# Patient Record
Sex: MALE | Race: WHITE | NOT HISPANIC OR LATINO | Employment: FULL TIME | URBAN - NONMETROPOLITAN AREA
[De-identification: names, ages, dates, MRNs, and addresses within clinical notes are randomized per-mention and may not be internally consistent; named-entity substitution may affect disease eponyms.]

---

## 2024-07-02 ENCOUNTER — OFFICE VISIT (OUTPATIENT)
Dept: FAMILY MEDICINE CLINIC | Facility: CLINIC | Age: 49
End: 2024-07-02
Payer: COMMERCIAL

## 2024-07-02 VITALS
DIASTOLIC BLOOD PRESSURE: 84 MMHG | WEIGHT: 261.4 LBS | HEART RATE: 78 BPM | HEIGHT: 72 IN | OXYGEN SATURATION: 99 % | SYSTOLIC BLOOD PRESSURE: 128 MMHG | BODY MASS INDEX: 35.41 KG/M2 | RESPIRATION RATE: 16 BRPM

## 2024-07-02 DIAGNOSIS — I10 HYPERTENSION, ESSENTIAL: ICD-10-CM

## 2024-07-02 DIAGNOSIS — R94.31 ABNORMAL EKG: ICD-10-CM

## 2024-07-02 DIAGNOSIS — Z83.3 FAMILY HISTORY OF DIABETES MELLITUS: ICD-10-CM

## 2024-07-02 DIAGNOSIS — Z12.5 SCREENING FOR PROSTATE CANCER: ICD-10-CM

## 2024-07-02 DIAGNOSIS — Z00.00 PHYSICAL EXAM: Primary | ICD-10-CM

## 2024-07-02 DIAGNOSIS — M25.551 RIGHT HIP PAIN: ICD-10-CM

## 2024-07-02 DIAGNOSIS — Z12.11 SCREENING FOR MALIGNANT NEOPLASM OF COLON: ICD-10-CM

## 2024-07-02 DIAGNOSIS — R01.1 MURMUR: ICD-10-CM

## 2024-07-02 DIAGNOSIS — E66.01 MORBID (SEVERE) OBESITY DUE TO EXCESS CALORIES: ICD-10-CM

## 2024-07-02 RX ORDER — MELOXICAM 15 MG/1
15 TABLET ORAL DAILY
Qty: 90 TABLET | Refills: 1 | Status: SHIPPED | OUTPATIENT
Start: 2024-07-02

## 2024-07-02 RX ORDER — LISINOPRIL 20 MG/1
20 TABLET ORAL DAILY
Qty: 90 TABLET | Refills: 1 | Status: SHIPPED | OUTPATIENT
Start: 2024-07-02

## 2024-07-02 RX ORDER — LISINOPRIL 20 MG/1
20 TABLET ORAL DAILY
COMMUNITY
Start: 2024-04-16 | End: 2024-07-02 | Stop reason: SDUPTHER

## 2024-07-02 RX ORDER — MELOXICAM 15 MG/1
15 TABLET ORAL DAILY
COMMUNITY
End: 2024-07-02 | Stop reason: SDUPTHER

## 2024-07-02 NOTE — ASSESSMENT & PLAN NOTE
Patient has no chest pain.  He has no shortness of breath.  He has no abnormal symptoms.  His EKG shows inverted T wave in V4 and V5 and V6.  Has criteria for LVH.  We are going to get a stress Myoview.
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
## 2024-07-26 ENCOUNTER — LAB (OUTPATIENT)
Dept: FAMILY MEDICINE CLINIC | Facility: CLINIC | Age: 49
End: 2024-07-26
Payer: COMMERCIAL

## 2024-07-26 DIAGNOSIS — Z12.11 SCREENING FOR MALIGNANT NEOPLASM OF COLON: ICD-10-CM

## 2024-07-26 DIAGNOSIS — Z00.00 PHYSICAL EXAM: ICD-10-CM

## 2024-07-26 DIAGNOSIS — Z12.5 SCREENING FOR PROSTATE CANCER: ICD-10-CM

## 2024-07-26 DIAGNOSIS — Z83.3 FAMILY HISTORY OF DIABETES MELLITUS: ICD-10-CM

## 2024-07-26 DIAGNOSIS — I10 HYPERTENSION, ESSENTIAL: ICD-10-CM

## 2024-07-26 DIAGNOSIS — E66.01 MORBID (SEVERE) OBESITY DUE TO EXCESS CALORIES: ICD-10-CM

## 2024-07-26 PROCEDURE — 36415 COLL VENOUS BLD VENIPUNCTURE: CPT | Performed by: FAMILY MEDICINE

## 2024-07-27 LAB
25(OH)D3+25(OH)D2 SERPL-MCNC: 53.5 NG/ML (ref 30–100)
ALBUMIN SERPL-MCNC: 4.1 G/DL (ref 4.1–5.1)
ALP SERPL-CCNC: 66 IU/L (ref 44–121)
ALT SERPL-CCNC: 23 IU/L (ref 0–44)
AST SERPL-CCNC: 31 IU/L (ref 0–40)
BASOPHILS # BLD AUTO: 0.2 X10E3/UL (ref 0–0.2)
BASOPHILS NFR BLD AUTO: 2 %
BILIRUB SERPL-MCNC: 0.5 MG/DL (ref 0–1.2)
BUN SERPL-MCNC: 26 MG/DL (ref 6–24)
BUN/CREAT SERPL: 18 (ref 9–20)
CALCIUM SERPL-MCNC: 9.5 MG/DL (ref 8.7–10.2)
CHLORIDE SERPL-SCNC: 102 MMOL/L (ref 96–106)
CHOLEST SERPL-MCNC: 145 MG/DL (ref 100–199)
CK SERPL-CCNC: 341 U/L (ref 49–439)
CO2 SERPL-SCNC: 25 MMOL/L (ref 20–29)
CREAT SERPL-MCNC: 1.48 MG/DL (ref 0.76–1.27)
EGFRCR SERPLBLD CKD-EPI 2021: 58 ML/MIN/1.73
EOSINOPHIL # BLD AUTO: 0.1 X10E3/UL (ref 0–0.4)
EOSINOPHIL NFR BLD AUTO: 2 %
ERYTHROCYTE [DISTWIDTH] IN BLOOD BY AUTOMATED COUNT: 13 % (ref 11.6–15.4)
GLOBULIN SER CALC-MCNC: 2.5 G/DL (ref 1.5–4.5)
GLUCOSE SERPL-MCNC: 95 MG/DL (ref 70–99)
HBA1C MFR BLD: 5.6 % (ref 4.8–5.6)
HCT VFR BLD AUTO: 41.5 % (ref 37.5–51)
HCV IGG SERPL QL IA: NON REACTIVE
HDLC SERPL-MCNC: 27 MG/DL
HGB BLD-MCNC: 14.1 G/DL (ref 13–17.7)
IMM GRANULOCYTES # BLD AUTO: 0.3 X10E3/UL (ref 0–0.1)
IMM GRANULOCYTES NFR BLD AUTO: 4 %
LDLC SERPL CALC-MCNC: 95 MG/DL (ref 0–99)
LYMPHOCYTES # BLD AUTO: 2.2 X10E3/UL (ref 0.7–3.1)
LYMPHOCYTES NFR BLD AUTO: 26 %
MCH RBC QN AUTO: 30.6 PG (ref 26.6–33)
MCHC RBC AUTO-ENTMCNC: 34 G/DL (ref 31.5–35.7)
MCV RBC AUTO: 90 FL (ref 79–97)
MONOCYTES # BLD AUTO: 0.9 X10E3/UL (ref 0.1–0.9)
MONOCYTES NFR BLD AUTO: 10 %
NEUTROPHILS # BLD AUTO: 4.9 X10E3/UL (ref 1.4–7)
NEUTROPHILS NFR BLD AUTO: 56 %
PLATELET # BLD AUTO: 177 X10E3/UL (ref 150–450)
POTASSIUM SERPL-SCNC: 4.2 MMOL/L (ref 3.5–5.2)
PROT SERPL-MCNC: 6.6 G/DL (ref 6–8.5)
PSA SERPL-MCNC: 0.8 NG/ML (ref 0–4)
RBC # BLD AUTO: 4.61 X10E6/UL (ref 4.14–5.8)
SODIUM SERPL-SCNC: 140 MMOL/L (ref 134–144)
TRIGL SERPL-MCNC: 125 MG/DL (ref 0–149)
TSH SERPL DL<=0.005 MIU/L-ACNC: 1.02 UIU/ML (ref 0.45–4.5)
VLDLC SERPL CALC-MCNC: 23 MG/DL (ref 5–40)
WBC # BLD AUTO: 8.6 X10E3/UL (ref 3.4–10.8)

## 2024-07-30 ENCOUNTER — OFFICE VISIT (OUTPATIENT)
Dept: CARDIOLOGY | Facility: CLINIC | Age: 49
End: 2024-07-30
Payer: COMMERCIAL

## 2024-07-30 VITALS
DIASTOLIC BLOOD PRESSURE: 76 MMHG | HEART RATE: 78 BPM | HEIGHT: 72 IN | BODY MASS INDEX: 35.21 KG/M2 | SYSTOLIC BLOOD PRESSURE: 115 MMHG | WEIGHT: 260 LBS | RESPIRATION RATE: 16 BRPM | OXYGEN SATURATION: 98 %

## 2024-07-30 DIAGNOSIS — R94.31 ABNORMAL EKG: Primary | ICD-10-CM

## 2024-07-30 DIAGNOSIS — E66.01 MORBID (SEVERE) OBESITY DUE TO EXCESS CALORIES: ICD-10-CM

## 2024-07-30 DIAGNOSIS — I10 HYPERTENSION, ESSENTIAL: ICD-10-CM

## 2024-07-30 DIAGNOSIS — I25.5 ISCHEMIC CARDIOMYOPATHY: ICD-10-CM

## 2024-07-30 DIAGNOSIS — R94.39 ABNORMAL NUCLEAR STRESS TEST: ICD-10-CM

## 2024-07-30 DIAGNOSIS — Z91.89 AT RISK FOR SUDDEN CARDIAC DEATH: ICD-10-CM

## 2024-07-30 DIAGNOSIS — R01.1 MURMUR: ICD-10-CM

## 2024-07-30 PROCEDURE — 99204 OFFICE O/P NEW MOD 45 MIN: CPT | Performed by: INTERNAL MEDICINE

## 2024-07-30 RX ORDER — CARVEDILOL 12.5 MG/1
12.5 TABLET ORAL 2 TIMES DAILY
Qty: 180 TABLET | Refills: 3 | Status: SHIPPED | OUTPATIENT
Start: 2024-07-30

## 2024-07-30 RX ORDER — ASPIRIN 81 MG/1
81 TABLET ORAL DAILY
Qty: 90 TABLET | Refills: 3 | Status: SHIPPED | OUTPATIENT
Start: 2024-07-30

## 2024-07-30 RX ORDER — SACUBITRIL AND VALSARTAN 24; 26 MG/1; MG/1
1 TABLET, FILM COATED ORAL 2 TIMES DAILY
Qty: 60 TABLET | Refills: 1 | Status: SHIPPED | OUTPATIENT
Start: 2024-07-30

## 2024-07-30 RX ORDER — DAPAGLIFLOZIN 10 MG/1
10 TABLET, FILM COATED ORAL DAILY
Qty: 90 TABLET | Refills: 2 | Status: SHIPPED | OUTPATIENT
Start: 2024-07-30 | End: 2024-08-01 | Stop reason: CLARIF

## 2024-07-30 NOTE — PROGRESS NOTES
[facility]  [facility] CARDIOLOGY  [address] [place] [address]  Dept: [phone]  Dept Fax: [phone]    Robson Babin  1975    New Patient Office Note    History of Present Illness:  Robson Babin is a 48 y.o. male who presents to the clinic for  Abnormal stress test nuclear and heart murmur- He is 48 years old, has hypertension, know to have a heart murmur since he was 15, seems has never been totally evaluated for he denies any CP., SOB, edema, palpitations, he went to see Dr Christian and a EKG was abnormal so he came in for a stress nuclear, his EKG showed LVH and poor R wave progression, his stress nuclear indicates large fixed apical defect and also decreased counts in the inferior wall at resting and much worse on stress, his Ef is very low around 20% with global Hypokinesis,He walked on the treadmill for over 6 minutes although he states was able to keep going,  his parents both has had CAD and CABG,, his cardiac exam     is normal except a GORGE in the left sternal border  , he takes Lisinopril for hypertension and has a Lipid profile with LDL 95,  his creatinine is elevated at 1,6. Will get a troponin and BNP, also a Urine test, and urine protein, , will start Entresto 24.26 bid, coreg 12,5 bid plus farxiga 10 mg daily, he is schedule soon for an echo, will also set him for a life vest and cardiac cath    The following portions of the patient's history were reviewed and updated as appropriate: allergies, current medications, past family history, past medical history, past social history, past surgical history, and problem list.    Medications:  aspirin  carvedilol  dapagliflozin Propanediol tablet  Entresto tablet  sertraline    Subjective  No Known Allergies     Past Medical History:   Diagnosis Date    Hypertension        No past surgical history on file.    Family History   Problem Relation Age of Onset    Heart disease Mother     Heart disease Father      "Prostate cancer Father         Social History     Socioeconomic History    Marital status:    Tobacco Use    Smoking status: Former     Types: Cigarettes     Start date:      Quit date:      Years since quittin.5    Smokeless tobacco: Never   Vaping Use    Vaping status: Every Day    Substances: Nicotine   Substance and Sexual Activity    Drug use: Yes     Types: Marijuana       Review of Systems   Constitutional: Negative.    HENT: Negative.     Respiratory: Negative.     Cardiovascular: Negative.    Endocrine: Negative.    Genitourinary: Negative.    Musculoskeletal: Negative.    Skin: Negative.    Allergic/Immunologic: Negative.    Neurological: Negative.    Hematological: Negative.    Psychiatric/Behavioral: Negative.       Cardiovascular Procedures    ECHO/MUGA:  STRESS TESTS:   CARDIAC CATH:   DEVICES:   HOLTER:   CT/MRI:   VASCULAR:   CARDIOTHORACIC:     Objective  Vitals:    24 1528   BP: 115/76   Pulse: 78   Resp: 16   SpO2: 98%   Weight: 118 kg (260 lb)   Height: 182.9 cm (72\")   PainSc: 0-No pain       Physical Exam  Vitals reviewed.   Constitutional:       Appearance: Healthy appearance. Not in distress.   Eyes:      Pupils: Pupils are equal, round, and reactive to light.   HENT:    Mouth/Throat:      Pharynx: Oropharynx is clear.   Neck:      Thyroid: Thyroid normal.      Vascular: No JVR. JVD normal.   Pulmonary:      Effort: Pulmonary effort is normal.      Breath sounds: Normal breath sounds. No wheezing. No rhonchi. No rales.   Chest:      Chest wall: Not tender to palpatation.   Cardiovascular:      PMI at left midclavicular line. Normal rate. Regular rhythm. Normal S1. Normal S2.       Murmurs: There is no murmur.      No gallop.  No click. No rub.   Pulses:     Intact distal pulses.      Carotid: 3+ bilaterally.     Radial: 3+ bilaterally.     Femoral: 3+ bilaterally.     Dorsalis pedis: 3+ bilaterally.     Posterior tibial: 3+ bilaterally.  Edema:     Peripheral edema " absent.   Abdominal:      General: Bowel sounds are normal.      Palpations: Abdomen is soft.      Tenderness: There is no abdominal tenderness.   Musculoskeletal: Normal range of motion.         General: No tenderness.      Cervical back: Normal range of motion and neck supple. Skin:     General: Skin is warm and dry.   Neurological:      General: No focal deficit present.      Mental Status: Alert and oriented to person, place and time.        Diagnostic Data  Procedures    Assessment and Plan  Diagnoses and all orders for this visit:    Abnormal EKG- suggesting old septal MI, abnormal stress nuclear also low EF    Hypertension, essential- Bp is fine 130.70, will use Entresto and Coreg instead Lisinopril  -     Cancel: Urinalysis without microscopic (no culture) - Urine, Clean Catch; Future  -     Protein / Creatinine Ratio, Urine - Urine, Clean Catch  -     Urinalysis without microscopic (no culture) - Urine, Clean Catch        Abnormal nuclear stress test- Suggest ischemic cardiomyopathy low Ef 20%,, will cath    Ischemic cardiomyopathy- Possible, likely, will cath, his LV is dilated as well as the RV  -     proBNP  -     Troponin T    At risk for sudden cardiac death- Will need a life vest.  Heart murmur- seems GORGE. Will review the echo    Other orders  -     sacubitril-valsartan (Entresto) 24-26 MG tablet; Take 1 tablet by mouth 2 (Two) Times a Day.  -     carvedilol (COREG) 12.5 MG tablet; Take 1 tablet by mouth 2 (Two) Times a Day.  -     dapagliflozin Propanediol (Farxiga) 10 MG tablet; Take 10 mg by mouth Daily.  -     aspirin 81 MG EC tablet; Take 1 tablet by mouth Daily.        No follow-ups on file.    Rony Aldridge MD  07/30/2024

## 2024-07-30 NOTE — H&P (VIEW-ONLY)
[facility]  [facility] CARDIOLOGY  [address] [place] [address]  Dept: [phone]  Dept Fax: [phone]    Robson Babin  1975    New Patient Office Note    History of Present Illness:  Robson Babin is a 48 y.o. male who presents to the clinic for  Abnormal stress test nuclear and heart murmur- He is 48 years old, has hypertension, know to have a heart murmur since he was 15, seems has never been totally evaluated for he denies any CP., SOB, edema, palpitations, he went to see Dr Christian and a EKG was abnormal so he came in for a stress nuclear, his EKG showed LVH and poor R wave progression, his stress nuclear indicates large fixed apical defect and also decreased counts in the inferior wall at resting and much worse on stress, his Ef is very low around 20% with global Hypokinesis,He walked on the treadmill for over 6 minutes although he states was able to keep going,  his parents both has had CAD and CABG,, his cardiac exam     is normal except a GORGE in the left sternal border  , he takes Lisinopril for hypertension and has a Lipid profile with LDL 95,  his creatinine is elevated at 1,6. Will get a troponin and BNP, also a Urine test, and urine protein, , will start Entresto 24.26 bid, coreg 12,5 bid plus farxiga 10 mg daily, he is schedule soon for an echo, will also set him for a life vest and cardiac cath    The following portions of the patient's history were reviewed and updated as appropriate: allergies, current medications, past family history, past medical history, past social history, past surgical history, and problem list.    Medications:  aspirin  carvedilol  dapagliflozin Propanediol tablet  Entresto tablet  sertraline    Subjective  No Known Allergies     Past Medical History:   Diagnosis Date    Hypertension        No past surgical history on file.    Family History   Problem Relation Age of Onset    Heart disease Mother     Heart disease Father      "Prostate cancer Father         Social History     Socioeconomic History    Marital status:    Tobacco Use    Smoking status: Former     Types: Cigarettes     Start date:      Quit date:      Years since quittin.5    Smokeless tobacco: Never   Vaping Use    Vaping status: Every Day    Substances: Nicotine   Substance and Sexual Activity    Drug use: Yes     Types: Marijuana       Review of Systems   Constitutional: Negative.    HENT: Negative.     Respiratory: Negative.     Cardiovascular: Negative.    Endocrine: Negative.    Genitourinary: Negative.    Musculoskeletal: Negative.    Skin: Negative.    Allergic/Immunologic: Negative.    Neurological: Negative.    Hematological: Negative.    Psychiatric/Behavioral: Negative.       Cardiovascular Procedures    ECHO/MUGA:  STRESS TESTS:   CARDIAC CATH:   DEVICES:   HOLTER:   CT/MRI:   VASCULAR:   CARDIOTHORACIC:     Objective  Vitals:    24 1528   BP: 115/76   Pulse: 78   Resp: 16   SpO2: 98%   Weight: 118 kg (260 lb)   Height: 182.9 cm (72\")   PainSc: 0-No pain       Physical Exam  Vitals reviewed.   Constitutional:       Appearance: Healthy appearance. Not in distress.   Eyes:      Pupils: Pupils are equal, round, and reactive to light.   HENT:    Mouth/Throat:      Pharynx: Oropharynx is clear.   Neck:      Thyroid: Thyroid normal.      Vascular: No JVR. JVD normal.   Pulmonary:      Effort: Pulmonary effort is normal.      Breath sounds: Normal breath sounds. No wheezing. No rhonchi. No rales.   Chest:      Chest wall: Not tender to palpatation.   Cardiovascular:      PMI at left midclavicular line. Normal rate. Regular rhythm. Normal S1. Normal S2.       Murmurs: There is no murmur.      No gallop.  No click. No rub.   Pulses:     Intact distal pulses.      Carotid: 3+ bilaterally.     Radial: 3+ bilaterally.     Femoral: 3+ bilaterally.     Dorsalis pedis: 3+ bilaterally.     Posterior tibial: 3+ bilaterally.  Edema:     Peripheral edema " absent.   Abdominal:      General: Bowel sounds are normal.      Palpations: Abdomen is soft.      Tenderness: There is no abdominal tenderness.   Musculoskeletal: Normal range of motion.         General: No tenderness.      Cervical back: Normal range of motion and neck supple. Skin:     General: Skin is warm and dry.   Neurological:      General: No focal deficit present.      Mental Status: Alert and oriented to person, place and time.        Diagnostic Data  Procedures    Assessment and Plan  Diagnoses and all orders for this visit:    Abnormal EKG- suggesting old septal MI, abnormal stress nuclear also low EF    Hypertension, essential- Bp is fine 130.70, will use Entresto and Coreg instead Lisinopril  -     Cancel: Urinalysis without microscopic (no culture) - Urine, Clean Catch; Future  -     Protein / Creatinine Ratio, Urine - Urine, Clean Catch  -     Urinalysis without microscopic (no culture) - Urine, Clean Catch        Abnormal nuclear stress test- Suggest ischemic cardiomyopathy low Ef 20%,, will cath    Ischemic cardiomyopathy- Possible, likely, will cath, his LV is dilated as well as the RV  -     proBNP  -     Troponin T    At risk for sudden cardiac death- Will need a life vest.  Heart murmur- seems GORGE. Will review the echo    Other orders  -     sacubitril-valsartan (Entresto) 24-26 MG tablet; Take 1 tablet by mouth 2 (Two) Times a Day.  -     carvedilol (COREG) 12.5 MG tablet; Take 1 tablet by mouth 2 (Two) Times a Day.  -     dapagliflozin Propanediol (Farxiga) 10 MG tablet; Take 10 mg by mouth Daily.  -     aspirin 81 MG EC tablet; Take 1 tablet by mouth Daily.        No follow-ups on file.    Rony Aldridge MD  07/30/2024

## 2024-07-31 ENCOUNTER — TELEPHONE (OUTPATIENT)
Dept: CARDIOLOGY | Facility: CLINIC | Age: 49
End: 2024-07-31
Payer: COMMERCIAL

## 2024-07-31 LAB
APPEARANCE UR: CLEAR
BILIRUB UR QL STRIP: NEGATIVE
COLOR UR: YELLOW
CREAT UR-MCNC: 24.5 MG/DL
GLUCOSE UR QL STRIP: NEGATIVE
HGB UR QL STRIP: NEGATIVE
KETONES UR QL STRIP: NEGATIVE
LEUKOCYTE ESTERASE UR QL STRIP: NEGATIVE
NITRITE UR QL STRIP: NEGATIVE
NT-PROBNP SERPL-MCNC: 2250 PG/ML (ref 0–121)
PH UR STRIP: 7 [PH] (ref 5–7.5)
PROT UR QL STRIP: NEGATIVE
PROT UR-MCNC: <4 MG/DL
PROT/CREAT UR: ABNORMAL MG/G CREAT (ref 0–200)
SP GR UR STRIP: 1.01 (ref 1–1.03)
TROPONIN T SERPL HS-MCNC: 22 NG/L (ref 0–22)
UROBILINOGEN UR STRIP-MCNC: 0.2 MG/DL (ref 0.2–1)

## 2024-07-31 NOTE — TELEPHONE ENCOUNTER
----- Message from Jackie LUJAN sent at 7/31/2024  9:02 AM EDT -----  Urine test normal, there is no protein.  Our echo tech confirmed she can do the echo this Friday at 7am.  Eloisa sent your office note to the email you provided yesterday;  ----- Message -----  From: Rony Aldridge MD  Sent: 7/31/2024   8:48 AM EDT  To: Jackie Kessler RN    Urine test normal np  protein

## 2024-07-31 NOTE — TELEPHONE ENCOUNTER
----- Message from Rony Aldridge sent at 7/31/2024  4:15 PM EDT -----  BNP is 2,250,  troponin is negative

## 2024-07-31 NOTE — TELEPHONE ENCOUNTER
Spoke with Mr. Babin and advised him that his troponin was negative. Your BNP, looks for fluid related to your heart, is elevated 2250 so if any sob, swelling most likely coming from the heart issues.  We have sent the order for life vest and if approved without the could be approved and they call you to fit you as soon as tomorrow.  He verbalized understanding.

## 2024-08-01 ENCOUNTER — PREP FOR SURGERY (OUTPATIENT)
Dept: OTHER | Facility: HOSPITAL | Age: 49
End: 2024-08-01
Payer: COMMERCIAL

## 2024-08-01 RX ORDER — ACETAMINOPHEN 325 MG/1
650 TABLET ORAL EVERY 4 HOURS PRN
OUTPATIENT
Start: 2024-08-01

## 2024-08-01 RX ORDER — SODIUM CHLORIDE 9 MG/ML
40 INJECTION, SOLUTION INTRAVENOUS AS NEEDED
OUTPATIENT
Start: 2024-08-01

## 2024-08-01 RX ORDER — ASPIRIN 81 MG/1
324 TABLET, CHEWABLE ORAL ONCE
OUTPATIENT
Start: 2024-08-01 | End: 2024-08-01

## 2024-08-01 RX ORDER — DAPAGLIFLOZIN 10 MG/1
10 TABLET, FILM COATED ORAL DAILY
Qty: 90 TABLET | Refills: 1 | Status: SHIPPED | OUTPATIENT
Start: 2024-08-01

## 2024-08-01 RX ORDER — NITROGLYCERIN 0.4 MG/1
0.4 TABLET SUBLINGUAL
OUTPATIENT
Start: 2024-08-01

## 2024-08-01 RX ORDER — ASPIRIN 81 MG/1
81 TABLET ORAL DAILY
OUTPATIENT
Start: 2024-08-02

## 2024-08-01 RX ORDER — SODIUM CHLORIDE 0.9 % (FLUSH) 0.9 %
10 SYRINGE (ML) INJECTION EVERY 12 HOURS SCHEDULED
OUTPATIENT
Start: 2024-08-01

## 2024-08-01 RX ORDER — SODIUM CHLORIDE 0.9 % (FLUSH) 0.9 %
10 SYRINGE (ML) INJECTION AS NEEDED
OUTPATIENT
Start: 2024-08-01

## 2024-08-01 RX ORDER — DAPAGLIFLOZIN 10 MG/1
10 TABLET, FILM COATED ORAL DAILY
COMMUNITY
End: 2024-08-01 | Stop reason: SDUPTHER

## 2024-08-02 ENCOUNTER — OFFICE VISIT (OUTPATIENT)
Dept: FAMILY MEDICINE CLINIC | Facility: CLINIC | Age: 49
End: 2024-08-02
Payer: COMMERCIAL

## 2024-08-02 VITALS
BODY MASS INDEX: 35.01 KG/M2 | WEIGHT: 258.5 LBS | HEART RATE: 67 BPM | RESPIRATION RATE: 16 BRPM | SYSTOLIC BLOOD PRESSURE: 130 MMHG | DIASTOLIC BLOOD PRESSURE: 82 MMHG | HEIGHT: 72 IN | OXYGEN SATURATION: 98 %

## 2024-08-02 DIAGNOSIS — I25.5 ISCHEMIC CARDIOMYOPATHY: Primary | ICD-10-CM

## 2024-08-02 DIAGNOSIS — I35.0 AORTIC VALVE STENOSIS, ETIOLOGY OF CARDIAC VALVE DISEASE UNSPECIFIED: ICD-10-CM

## 2024-08-02 DIAGNOSIS — E66.01 MORBID (SEVERE) OBESITY DUE TO EXCESS CALORIES: ICD-10-CM

## 2024-08-02 DIAGNOSIS — I10 HYPERTENSION, ESSENTIAL: ICD-10-CM

## 2024-08-02 DIAGNOSIS — N18.31 CHRONIC KIDNEY DISEASE, STAGE 3A: ICD-10-CM

## 2024-08-02 PROCEDURE — 99214 OFFICE O/P EST MOD 30 MIN: CPT | Performed by: FAMILY MEDICINE

## 2024-08-02 RX ORDER — ROSUVASTATIN CALCIUM 20 MG/1
20 TABLET, COATED ORAL DAILY
Qty: 90 TABLET | Refills: 1 | Status: SHIPPED | OUTPATIENT
Start: 2024-08-02

## 2024-08-02 NOTE — ASSESSMENT & PLAN NOTE
GFR is 58.  Will get a renal ultrasound.Patient is instructed to not take any NSAIDs.  Medicines as directed.  Stay well-hydrated.

## 2024-08-02 NOTE — PROGRESS NOTES
"       Patient Name: Robson Babin  : 1975   MRN: [id number]     Chief Complaint:    Chief Complaint   Patient presents with    Follow-up       History of Present Illness: Robson Babin is a 48 y.o. male who is here today for follow up on heart problems and RF  HPI        Review of Systems:   Review of Systems     Past Medical History:   Past Medical History:   Diagnosis Date    Hypertension        Past Surgical History: No past surgical history on file.    Family History:   Family History   Problem Relation Age of Onset    Heart disease Mother     Heart disease Father     Prostate cancer Father        Social History:   Social History     Socioeconomic History    Marital status:    Tobacco Use    Smoking status: Former     Types: Cigarettes     Start date:      Quit date:      Years since quittin.6    Smokeless tobacco: Never   Vaping Use    Vaping status: Every Day    Substances: Nicotine   Substance and Sexual Activity    Drug use: Yes     Types: Marijuana       Medications:     Current Outpatient Medications:     aspirin 81 MG EC tablet, Take 1 tablet by mouth Daily., Disp: 90 tablet, Rfl: 3    carvedilol (COREG) 12.5 MG tablet, Take 1 tablet by mouth 2 (Two) Times a Day., Disp: 180 tablet, Rfl: 3    Farxiga 10 MG tablet, Take 10 mg by mouth Daily., Disp: 90 tablet, Rfl: 1    sacubitril-valsartan (Entresto) 24-26 MG tablet, Take 1 tablet by mouth 2 (Two) Times a Day., Disp: 60 tablet, Rfl: 1    sertraline (ZOLOFT) 50 MG tablet, Take 1 tablet by mouth Daily., Disp: 90 tablet, Rfl: 1    rosuvastatin (CRESTOR) 20 MG tablet, Take 1 tablet by mouth Daily., Disp: 90 tablet, Rfl: 1    Allergies:   No Known Allergies      Physical Exam:  Vital Signs:   Vitals:    24 1428   BP: 130/82   BP Location: Left arm   Patient Position: Sitting   Cuff Size: Adult   Pulse: 67   Resp: 16   SpO2: 98%   Weight: 117 kg (258 lb 8 oz)   Height: 182.9 cm (72.01\")     Body mass index is 35.05 kg/m². "     Physical Exam    Procedures      Assessment/Plan:   Diagnoses and all orders for this visit:    1. Ischemic cardiomyopathy (Primary)  Assessment & Plan:  Patient is going to get a heart cath on Wednesday.  I am going to start him on rosuvastatin.  He is going to monitor his blood pressure.  He is seeing cardiology.  I told him if he has any chest pain before his heart cath get emergency room.  Recheck in 2 months.  His echocardiogram showed ejection fraction of 35%.  This seems more in line with his physiologic activity.  Will wait for the heart cath to get a more accurate assessment.    Orders:  -     Comprehensive Metabolic Panel; Future  -     Lipid Panel; Future  -     CBC & Differential; Future  -     Vitamin B12; Future  -     TSH Rfx On Abnormal To Free T4; Future    2. Chronic kidney disease, stage 3a  Assessment & Plan:  GFR is 58.  Will get a renal ultrasound.Patient is instructed to not take any NSAIDs.  Medicines as directed.  Stay well-hydrated.      Orders:  -     US Renal Bilateral; Future  -     Comprehensive Metabolic Panel; Future  -     Lipid Panel; Future  -     CBC & Differential; Future  -     Vitamin B12; Future  -     TSH Rfx On Abnormal To Free T4; Future    3. Hypertension, essential  Assessment & Plan:  Discussed with patient to monitor their blood pressure and if systolic blood pressure goes above 140 or diastolic is above 90 to return to clinic.  Take medicines as directed, call for any problems, patient not having or any worrisome symptoms.        Orders:  -     Comprehensive Metabolic Panel; Future  -     Lipid Panel; Future  -     CBC & Differential; Future  -     Vitamin B12; Future  -     TSH Rfx On Abnormal To Free T4; Future    4. Morbid (severe) obesity due to excess calories  Assessment & Plan:  Patient's (Body mass index is 35.05 kg/m².) indicates that they are obese (BMI >30) with health conditions that include hypertension and coronary heart disease . Weight is unchanged.  BMI  is above average; BMI management plan is completed. We discussed portion control and increasing exercise.     Orders:  -     Comprehensive Metabolic Panel; Future  -     Lipid Panel; Future  -     CBC & Differential; Future  -     Vitamin B12; Future  -     TSH Rfx On Abnormal To Free T4; Future    5. Aortic valve stenosis, etiology of cardiac valve disease unspecified  Assessment & Plan:  Reviewed patient's echo.  Patient is getting a heart cath on Wednesday.  Recheck in 2 months.    Orders:  -     Comprehensive Metabolic Panel; Future  -     Lipid Panel; Future  -     CBC & Differential; Future  -     Vitamin B12; Future  -     TSH Rfx On Abnormal To Free T4; Future    Other orders  -     rosuvastatin (CRESTOR) 20 MG tablet; Take 1 tablet by mouth Daily.  Dispense: 90 tablet; Refill: 1             Follow Up:   Return in about 2 months (around 10/2/2024) for Annual physical, Bloodwork 1 week prior to next appointment.      Thomas Christian MD  [facility]

## 2024-08-02 NOTE — ASSESSMENT & PLAN NOTE
Patient is going to get a heart cath on Wednesday.  I am going to start him on rosuvastatin.  He is going to monitor his blood pressure.  He is seeing cardiology.  I told him if he has any chest pain before his heart cath get emergency room.  Recheck in 2 months.  His echocardiogram showed ejection fraction of 35%.  This seems more in line with his physiologic activity.  Will wait for the heart cath to get a more accurate assessment.

## 2024-08-02 NOTE — ASSESSMENT & PLAN NOTE
Patient's (Body mass index is 35.05 kg/m².) indicates that they are obese (BMI >30) with health conditions that include hypertension and coronary heart disease . Weight is unchanged. BMI  is above average; BMI management plan is completed. We discussed portion control and increasing exercise.

## 2024-08-05 ENCOUNTER — TELEPHONE (OUTPATIENT)
Dept: CARDIOLOGY | Facility: CLINIC | Age: 49
End: 2024-08-05
Payer: COMMERCIAL

## 2024-08-05 ENCOUNTER — OUTSIDE FACILITY SERVICE (OUTPATIENT)
Dept: CARDIOLOGY | Facility: CLINIC | Age: 49
End: 2024-08-05
Payer: COMMERCIAL

## 2024-08-05 DIAGNOSIS — Q23.1 AORTIC REGURGITATION DUE TO BICUSPID AORTIC VALVE: Primary | ICD-10-CM

## 2024-08-05 PROCEDURE — 93325 DOPPLER ECHO COLOR FLOW MAPG: CPT | Performed by: INTERNAL MEDICINE

## 2024-08-05 PROCEDURE — 93312 ECHO TRANSESOPHAGEAL: CPT | Performed by: INTERNAL MEDICINE

## 2024-08-05 PROCEDURE — 93320 DOPPLER ECHO COMPLETE: CPT | Performed by: INTERNAL MEDICINE

## 2024-08-05 NOTE — TELEPHONE ENCOUNTER
Farxiga 10mg tablets approved  Outcome  Approved today by [facility] 2017  Your PA request has been approved. Additional information will be provided in the approval communication. (Message 1144)  Authorization Expiration Date: 8/4/2025

## 2024-08-07 ENCOUNTER — HOSPITAL ENCOUNTER (OUTPATIENT)
Facility: HOSPITAL | Age: 49
Setting detail: HOSPITAL OUTPATIENT SURGERY
Discharge: HOME | End: 2024-08-07
Attending: INTERNAL MEDICINE | Admitting: INTERNAL MEDICINE
Payer: COMMERCIAL

## 2024-08-07 VITALS
HEART RATE: 60 BPM | TEMPERATURE: 97.3 F | RESPIRATION RATE: 16 BRPM | HEIGHT: 72 IN | WEIGHT: 258 LBS | SYSTOLIC BLOOD PRESSURE: 125 MMHG | OXYGEN SATURATION: 95 % | DIASTOLIC BLOOD PRESSURE: 88 MMHG | BODY MASS INDEX: 34.95 KG/M2

## 2024-08-07 DIAGNOSIS — R94.39 ABNORMAL NUCLEAR STRESS TEST: ICD-10-CM

## 2024-08-07 DIAGNOSIS — I25.5 ISCHEMIC CARDIOMYOPATHY: ICD-10-CM

## 2024-08-07 DIAGNOSIS — Z91.89 AT RISK FOR SUDDEN CARDIAC DEATH: ICD-10-CM

## 2024-08-07 DIAGNOSIS — I42.0 DILATED CARDIOMYOPATHY: Primary | ICD-10-CM

## 2024-08-07 PROCEDURE — 25010000002 HEPARIN (PORCINE) PER 1000 UNITS: Performed by: INTERNAL MEDICINE

## 2024-08-07 PROCEDURE — 93458 L HRT ARTERY/VENTRICLE ANGIO: CPT | Performed by: INTERNAL MEDICINE

## 2024-08-07 PROCEDURE — C1894 INTRO/SHEATH, NON-LASER: HCPCS | Performed by: INTERNAL MEDICINE

## 2024-08-07 PROCEDURE — C1769 GUIDE WIRE: HCPCS | Performed by: INTERNAL MEDICINE

## 2024-08-07 PROCEDURE — 25010000002 NICARDIPINE 2.5 MG/ML SOLUTION: Performed by: INTERNAL MEDICINE

## 2024-08-07 PROCEDURE — 25810000003 SODIUM CHLORIDE 0.9 % SOLUTION: Performed by: INTERNAL MEDICINE

## 2024-08-07 PROCEDURE — 25510000001 IOPAMIDOL PER 1 ML: Performed by: INTERNAL MEDICINE

## 2024-08-07 PROCEDURE — 25010000002 MIDAZOLAM PER 1 MG: Performed by: INTERNAL MEDICINE

## 2024-08-07 PROCEDURE — 25010000002 FENTANYL CITRATE (PF) 50 MCG/ML SOLUTION: Performed by: INTERNAL MEDICINE

## 2024-08-07 RX ORDER — MIDAZOLAM HYDROCHLORIDE 1 MG/ML
INJECTION INTRAMUSCULAR; INTRAVENOUS
Status: DISCONTINUED | OUTPATIENT
Start: 2024-08-07 | End: 2024-08-07 | Stop reason: HOSPADM

## 2024-08-07 RX ORDER — FENTANYL CITRATE 50 UG/ML
INJECTION, SOLUTION INTRAMUSCULAR; INTRAVENOUS
Status: DISCONTINUED | OUTPATIENT
Start: 2024-08-07 | End: 2024-08-07 | Stop reason: HOSPADM

## 2024-08-07 RX ORDER — SODIUM CHLORIDE 9 MG/ML
40 INJECTION, SOLUTION INTRAVENOUS AS NEEDED
Status: DISCONTINUED | OUTPATIENT
Start: 2024-08-07 | End: 2024-08-07 | Stop reason: HOSPADM

## 2024-08-07 RX ORDER — ACETAMINOPHEN 325 MG/1
650 TABLET ORAL EVERY 4 HOURS PRN
Status: DISCONTINUED | OUTPATIENT
Start: 2024-08-07 | End: 2024-08-07 | Stop reason: HOSPADM

## 2024-08-07 RX ORDER — NITROGLYCERIN 0.4 MG/1
0.4 TABLET SUBLINGUAL
Status: DISCONTINUED | OUTPATIENT
Start: 2024-08-07 | End: 2024-08-07 | Stop reason: HOSPADM

## 2024-08-07 RX ORDER — SODIUM CHLORIDE 9 MG/ML
100 INJECTION, SOLUTION INTRAVENOUS CONTINUOUS
Status: ACTIVE | OUTPATIENT
Start: 2024-08-07 | End: 2024-08-07

## 2024-08-07 RX ORDER — ASPIRIN 81 MG/1
324 TABLET, CHEWABLE ORAL ONCE
Status: COMPLETED | OUTPATIENT
Start: 2024-08-07 | End: 2024-08-07

## 2024-08-07 RX ORDER — LIDOCAINE HYDROCHLORIDE 10 MG/ML
INJECTION, SOLUTION EPIDURAL; INFILTRATION; INTRACAUDAL; PERINEURAL
Status: DISCONTINUED | OUTPATIENT
Start: 2024-08-07 | End: 2024-08-07 | Stop reason: HOSPADM

## 2024-08-07 RX ORDER — SODIUM CHLORIDE 0.9 % (FLUSH) 0.9 %
10 SYRINGE (ML) INJECTION AS NEEDED
Status: DISCONTINUED | OUTPATIENT
Start: 2024-08-07 | End: 2024-08-07 | Stop reason: HOSPADM

## 2024-08-07 RX ORDER — HEPARIN SODIUM 1000 [USP'U]/ML
INJECTION, SOLUTION INTRAVENOUS; SUBCUTANEOUS
Status: DISCONTINUED | OUTPATIENT
Start: 2024-08-07 | End: 2024-08-07 | Stop reason: HOSPADM

## 2024-08-07 RX ORDER — NICARDIPINE HCL-0.9% SOD CHLOR 1 MG/10 ML
SYRINGE (ML) INTRAVENOUS
Status: DISCONTINUED | OUTPATIENT
Start: 2024-08-07 | End: 2024-08-07 | Stop reason: HOSPADM

## 2024-08-07 RX ORDER — ASPIRIN 81 MG/1
81 TABLET ORAL DAILY
Status: DISCONTINUED | OUTPATIENT
Start: 2024-08-08 | End: 2024-08-07 | Stop reason: HOSPADM

## 2024-08-07 RX ORDER — SODIUM CHLORIDE 0.9 % (FLUSH) 0.9 %
10 SYRINGE (ML) INJECTION EVERY 12 HOURS SCHEDULED
Status: DISCONTINUED | OUTPATIENT
Start: 2024-08-07 | End: 2024-08-07 | Stop reason: HOSPADM

## 2024-08-07 RX ADMIN — ASPIRIN 324 MG: 81 TABLET, CHEWABLE ORAL at 08:37

## 2024-08-07 NOTE — INTERVAL H&P NOTE
H&P reviewed. The patient was examined and there are no changes to the H&P.      Vitals and Labs today:  There were no vitals filed for this visit.    Lab Results   Component Value Date    WBC 8.6 07/26/2024    HGB 14.1 07/26/2024    HCT 41.5 07/26/2024    MCV 90 07/26/2024     07/26/2024     Lab Results   Component Value Date    GLUCOSE 95 07/26/2024    BUN 26 (H) 07/26/2024    CREATININE 1.48 (H) 07/26/2024     07/26/2024    K 4.2 07/26/2024     07/26/2024    CALCIUM 9.5 07/26/2024    ALBUMIN 4.1 07/26/2024    ALT 23 07/26/2024    AST 31 07/26/2024    ALKPHOS 66 07/26/2024    BILITOT 0.5 07/26/2024    BCR 18 07/26/2024     Lab Results   Component Value Date    HGBA1C 5.6 07/26/2024     Lab Results   Component Value Date    CHLPL 145 07/26/2024    TRIG 125 07/26/2024    HDL 27 (L) 07/26/2024    LDL 95 07/26/2024       The risks, benefits, and alternative options of cardiac catheterization were discussed with the patient.  The risks include death, MI, stroke, infection, vascular injury requiring surgical repair and/or blood transfusion, coronary dissection, renal dysfunction/failure, allergic reaction, emergent CABG.  If PCI is needed there is a 1-2% risk of emergent CABG.  The patient is agreeable for cardiac catheterization, possible PCI or CABG. Plan is to proceed with cardiac catheterization and possible PCI.     Jeremy Wolfe MD, [facility], [facility]  Interventional Cardiology  08/07/24  08:34 EDT

## 2024-08-26 ENCOUNTER — HOSPITAL ENCOUNTER (OUTPATIENT)
Facility: HOSPITAL | Age: 49
Discharge: HOME | End: 2024-08-26
Admitting: INTERNAL MEDICINE
Payer: COMMERCIAL

## 2024-08-26 PROCEDURE — 0 GADOBENATE DIMEGLUMINE 529 MG/ML SOLUTION: Performed by: INTERNAL MEDICINE

## 2024-08-26 PROCEDURE — A9577 INJ MULTIHANCE: HCPCS | Performed by: INTERNAL MEDICINE

## 2024-08-26 PROCEDURE — 75561 CARDIAC MRI FOR MORPH W/DYE: CPT

## 2024-08-26 RX ADMIN — GADOBENATE DIMEGLUMINE 20 ML: 529 INJECTION, SOLUTION INTRAVENOUS at 10:31

## 2024-08-26 RX ADMIN — GADOBENATE DIMEGLUMINE 10 ML: 529 INJECTION, SOLUTION INTRAVENOUS at 10:30

## 2024-08-27 ENCOUNTER — HOSPITAL ENCOUNTER (OUTPATIENT)
Facility: HOSPITAL | Age: 49
Discharge: HOME | End: 2024-08-27
Admitting: FAMILY MEDICINE
Payer: COMMERCIAL

## 2024-08-27 DIAGNOSIS — N18.31 CHRONIC KIDNEY DISEASE, STAGE 3A: ICD-10-CM

## 2024-08-27 PROCEDURE — 76775 US EXAM ABDO BACK WALL LIM: CPT

## 2024-09-27 ENCOUNTER — LAB (OUTPATIENT)
Dept: FAMILY MEDICINE CLINIC | Facility: CLINIC | Age: 49
End: 2024-09-27
Payer: COMMERCIAL

## 2024-09-27 DIAGNOSIS — E66.01 MORBID (SEVERE) OBESITY DUE TO EXCESS CALORIES: ICD-10-CM

## 2024-09-27 DIAGNOSIS — I25.5 ISCHEMIC CARDIOMYOPATHY: ICD-10-CM

## 2024-09-27 DIAGNOSIS — N18.31 CHRONIC KIDNEY DISEASE, STAGE 3A: ICD-10-CM

## 2024-09-27 DIAGNOSIS — I35.0 AORTIC VALVE STENOSIS, ETIOLOGY OF CARDIAC VALVE DISEASE UNSPECIFIED: ICD-10-CM

## 2024-09-27 DIAGNOSIS — I10 HYPERTENSION, ESSENTIAL: ICD-10-CM

## 2024-10-03 LAB
BASOPHILS # BLD AUTO: 0 X10E3/UL (ref 0–0.2)
BASOPHILS NFR BLD AUTO: 0 %
EOSINOPHIL # BLD AUTO: 0.2 X10E3/UL (ref 0–0.4)
EOSINOPHIL NFR BLD AUTO: 1 %
ERYTHROCYTE [DISTWIDTH] IN BLOOD BY AUTOMATED COUNT: 13 % (ref 11.6–15.4)
HCT VFR BLD AUTO: 42.3 % (ref 37.5–51)
HGB BLD-MCNC: 14.2 G/DL (ref 13–17.7)
IMM GRANULOCYTES # BLD AUTO: 0.1 X10E3/UL (ref 0–0.1)
IMM GRANULOCYTES NFR BLD AUTO: 1 %
LYMPHOCYTES # BLD AUTO: 2.3 X10E3/UL (ref 0.7–3.1)
LYMPHOCYTES NFR BLD AUTO: 21 %
Lab: ABNORMAL
Lab: NORMAL
MCH RBC QN AUTO: 30.6 PG (ref 26.6–33)
MCHC RBC AUTO-ENTMCNC: 33.6 G/DL (ref 31.5–35.7)
MCV RBC AUTO: 91 FL (ref 79–97)
MONOCYTES # BLD AUTO: 1.1 X10E3/UL (ref 0.1–0.9)
MONOCYTES NFR BLD AUTO: 9 %
NEUTROPHILS # BLD AUTO: 7.5 X10E3/UL (ref 1.4–7)
NEUTROPHILS NFR BLD AUTO: 68 %
NTI SERUM GEL TUBE: NORMAL
PLATELET # BLD AUTO: 154 X10E3/UL (ref 150–450)
RBC # BLD AUTO: 4.64 X10E6/UL (ref 4.14–5.8)
WBC # BLD AUTO: 11.2 X10E3/UL (ref 3.4–10.8)

## 2024-10-04 ENCOUNTER — OFFICE VISIT (OUTPATIENT)
Dept: FAMILY MEDICINE CLINIC | Facility: CLINIC | Age: 49
End: 2024-10-04
Payer: COMMERCIAL

## 2024-10-04 VITALS
DIASTOLIC BLOOD PRESSURE: 68 MMHG | OXYGEN SATURATION: 97 % | HEIGHT: 72 IN | BODY MASS INDEX: 35.55 KG/M2 | WEIGHT: 262.5 LBS | RESPIRATION RATE: 16 BRPM | HEART RATE: 80 BPM | SYSTOLIC BLOOD PRESSURE: 112 MMHG

## 2024-10-04 DIAGNOSIS — I35.0 AORTIC VALVE STENOSIS, ETIOLOGY OF CARDIAC VALVE DISEASE UNSPECIFIED: ICD-10-CM

## 2024-10-04 DIAGNOSIS — N18.31 CHRONIC KIDNEY DISEASE, STAGE 3A: Primary | ICD-10-CM

## 2024-10-04 DIAGNOSIS — I10 HYPERTENSION, ESSENTIAL: ICD-10-CM

## 2024-10-04 DIAGNOSIS — Z23 IMMUNIZATION DUE: ICD-10-CM

## 2024-10-04 DIAGNOSIS — E66.01 MORBID (SEVERE) OBESITY DUE TO EXCESS CALORIES: ICD-10-CM

## 2024-10-04 DIAGNOSIS — I42.0 DILATED CARDIOMYOPATHY: ICD-10-CM

## 2024-10-04 PROCEDURE — 99214 OFFICE O/P EST MOD 30 MIN: CPT | Performed by: FAMILY MEDICINE

## 2024-10-04 PROCEDURE — 90656 IIV3 VACC NO PRSV 0.5 ML IM: CPT | Performed by: FAMILY MEDICINE

## 2024-10-04 PROCEDURE — 90471 IMMUNIZATION ADMIN: CPT | Performed by: FAMILY MEDICINE

## 2024-10-04 RX ORDER — SACUBITRIL AND VALSARTAN 24; 26 MG/1; MG/1
1 TABLET, FILM COATED ORAL 2 TIMES DAILY
Qty: 60 TABLET | Refills: 1 | Status: SHIPPED | OUTPATIENT
Start: 2024-10-04 | End: 2024-10-04 | Stop reason: SDUPTHER

## 2024-10-04 RX ORDER — CARVEDILOL 12.5 MG/1
12.5 TABLET ORAL 2 TIMES DAILY
Qty: 180 TABLET | Refills: 3 | Status: SHIPPED | OUTPATIENT
Start: 2024-10-04

## 2024-10-04 RX ORDER — DAPAGLIFLOZIN 10 MG/1
10 TABLET, FILM COATED ORAL DAILY
Qty: 90 TABLET | Refills: 1 | Status: SHIPPED | OUTPATIENT
Start: 2024-10-04

## 2024-10-04 RX ORDER — ROSUVASTATIN CALCIUM 20 MG/1
20 TABLET, COATED ORAL DAILY
Qty: 90 TABLET | Refills: 1 | Status: SHIPPED | OUTPATIENT
Start: 2024-10-04

## 2024-10-04 RX ORDER — DAPAGLIFLOZIN 10 MG/1
10 TABLET, FILM COATED ORAL DAILY
Qty: 90 TABLET | Refills: 1 | Status: SHIPPED | OUTPATIENT
Start: 2024-10-04 | End: 2024-10-04 | Stop reason: SDUPTHER

## 2024-10-04 RX ORDER — ASPIRIN 81 MG/1
81 TABLET ORAL DAILY
Qty: 90 TABLET | Refills: 3 | Status: SHIPPED | OUTPATIENT
Start: 2024-10-04 | End: 2024-10-04 | Stop reason: SDUPTHER

## 2024-10-04 RX ORDER — ROSUVASTATIN CALCIUM 20 MG/1
20 TABLET, COATED ORAL DAILY
Qty: 90 TABLET | Refills: 1 | Status: SHIPPED | OUTPATIENT
Start: 2024-10-04 | End: 2024-10-04 | Stop reason: SDUPTHER

## 2024-10-04 RX ORDER — SACUBITRIL AND VALSARTAN 24; 26 MG/1; MG/1
1 TABLET, FILM COATED ORAL 2 TIMES DAILY
Qty: 180 TABLET | Refills: 1 | Status: SHIPPED | OUTPATIENT
Start: 2024-10-04

## 2024-10-04 RX ORDER — ASPIRIN 81 MG/1
81 TABLET ORAL DAILY
Qty: 90 TABLET | Refills: 3 | Status: SHIPPED | OUTPATIENT
Start: 2024-10-04

## 2024-10-04 NOTE — ASSESSMENT & PLAN NOTE
Patient has dilated cardiomyopathy.  He has refused to wear the LifeVest.  We are to get him to lose weight.  He has a ejection fraction about 30 to 35%.  We stressed to him the importance of taking all his medicines and getting vaccines.

## 2024-10-04 NOTE — ASSESSMENT & PLAN NOTE
Patient being followed by cardiology.  Eventually he will need aortic valve replacement.  Will monitor.

## 2024-10-04 NOTE — ASSESSMENT & PLAN NOTE
Patient's (Body mass index is 35.59 kg/m².) indicates that they are obese (BMI >30) with health conditions that include hypertension and coronary heart disease . Weight is unchanged. BMI  is above average; BMI management plan is completed. We discussed portion control and increasing exercise.    Patient needs to lose weight.  He has a dilated cardiomyopathy.  I am going to start him on some Zepbound.  Return to clinic 1 month for recheck.  Will start at 2.5 mg.

## 2024-10-29 ENCOUNTER — LAB (OUTPATIENT)
Dept: FAMILY MEDICINE CLINIC | Facility: CLINIC | Age: 49
End: 2024-10-29
Payer: COMMERCIAL

## 2024-10-29 DIAGNOSIS — Z79.899 ENCOUNTER FOR LONG-TERM (CURRENT) USE OF MEDICATIONS: Primary | ICD-10-CM

## 2024-10-30 ENCOUNTER — TELEPHONE (OUTPATIENT)
Dept: FAMILY MEDICINE CLINIC | Facility: CLINIC | Age: 49
End: 2024-10-30

## 2024-10-30 LAB
ALBUMIN SERPL-MCNC: 4.1 G/DL (ref 4.1–5.1)
ALP SERPL-CCNC: 70 IU/L (ref 44–121)
ALT SERPL-CCNC: 24 IU/L (ref 0–44)
AST SERPL-CCNC: 26 IU/L (ref 0–40)
BASOPHILS # BLD AUTO: 0 X10E3/UL (ref 0–0.2)
BASOPHILS NFR BLD AUTO: 1 %
BILIRUB SERPL-MCNC: 0.3 MG/DL (ref 0–1.2)
BUN SERPL-MCNC: 18 MG/DL (ref 6–24)
BUN/CREAT SERPL: 13 (ref 9–20)
CALCIUM SERPL-MCNC: 9.1 MG/DL (ref 8.7–10.2)
CHLORIDE SERPL-SCNC: 106 MMOL/L (ref 96–106)
CHOLEST SERPL-MCNC: 111 MG/DL (ref 100–199)
CO2 SERPL-SCNC: 21 MMOL/L (ref 20–29)
CREAT SERPL-MCNC: 1.38 MG/DL (ref 0.76–1.27)
EGFRCR SERPLBLD CKD-EPI 2021: 63 ML/MIN/1.73
EOSINOPHIL # BLD AUTO: 0.2 X10E3/UL (ref 0–0.4)
EOSINOPHIL NFR BLD AUTO: 2 %
ERYTHROCYTE [DISTWIDTH] IN BLOOD BY AUTOMATED COUNT: 12.9 % (ref 11.6–15.4)
GLOBULIN SER CALC-MCNC: 2.6 G/DL (ref 1.5–4.5)
GLUCOSE SERPL-MCNC: 90 MG/DL (ref 70–99)
HBA1C MFR BLD: 5.5 % (ref 4.8–5.6)
HCT VFR BLD AUTO: 45.4 % (ref 37.5–51)
HDLC SERPL-MCNC: 30 MG/DL
HGB BLD-MCNC: 14.8 G/DL (ref 13–17.7)
IMM GRANULOCYTES # BLD AUTO: 0.1 X10E3/UL (ref 0–0.1)
IMM GRANULOCYTES NFR BLD AUTO: 1 %
LDLC SERPL CALC-MCNC: 62 MG/DL (ref 0–99)
LYMPHOCYTES # BLD AUTO: 2.5 X10E3/UL (ref 0.7–3.1)
LYMPHOCYTES NFR BLD AUTO: 30 %
MCH RBC QN AUTO: 29.4 PG (ref 26.6–33)
MCHC RBC AUTO-ENTMCNC: 32.6 G/DL (ref 31.5–35.7)
MCV RBC AUTO: 90 FL (ref 79–97)
MONOCYTES # BLD AUTO: 1 X10E3/UL (ref 0.1–0.9)
MONOCYTES NFR BLD AUTO: 12 %
NEUTROPHILS # BLD AUTO: 4.5 X10E3/UL (ref 1.4–7)
NEUTROPHILS NFR BLD AUTO: 54 %
PLATELET # BLD AUTO: 168 X10E3/UL (ref 150–450)
POTASSIUM SERPL-SCNC: 4.3 MMOL/L (ref 3.5–5.2)
PROT SERPL-MCNC: 6.7 G/DL (ref 6–8.5)
RBC # BLD AUTO: 5.04 X10E6/UL (ref 4.14–5.8)
SODIUM SERPL-SCNC: 140 MMOL/L (ref 134–144)
TRIGL SERPL-MCNC: 101 MG/DL (ref 0–149)
TSH SERPL DL<=0.005 MIU/L-ACNC: 0.79 UIU/ML (ref 0.45–4.5)
VIT B12 SERPL-MCNC: 363 PG/ML (ref 232–1245)
VLDLC SERPL CALC-MCNC: 19 MG/DL (ref 5–40)
WBC # BLD AUTO: 8.3 X10E3/UL (ref 3.4–10.8)

## 2024-10-30 NOTE — TELEPHONE ENCOUNTER
Caller: Robson Babin    Relationship: Self    Best call back number: [phone]    Which medication are you concerned about: PT CALLED STATED THAT RX    Farxiga 10 MG tablet    NEEDS A PA, WAITING ON PA SO THAT HE CAN GET RX FILLED.    [facility] [id number] - [place], [place] - [address] AT [address] - [phone]  - [phone] FX

## 2024-11-01 NOTE — TELEPHONE ENCOUNTER
Need Patients RXBIN, PCN, and group number to submit PA. Patient states he will call back with information.

## 2024-12-04 NOTE — ASSESSMENT & PLAN NOTE
Patient has dilated cardiomyopathy.  He has refused to wear the LifeVest.  We are to get him to lose weight.  He has a ejection fraction about 30 to 35%.  We stressed to him the importance of taking all his medicines and getting vaccines.  I am going to try and get him to stop vaping, monitor his blood pressure closely, and I will get him an appointment to see cardiology.  I would like them following him.

## 2024-12-04 NOTE — PROGRESS NOTES
Patient Name: Robson Babin  : 1975   MRN: [id number]     Chief Complaint:    Chief Complaint   Patient presents with    Primary Care Follow-Up       History of Present Illness: Robson Babin is a 49 y.o. male who is here today for follow up on kidney function weight and blood pressure  HPI        Review of Systems:   Review of Systems     Past Medical History:   Past Medical History:   Diagnosis Date    Hypertension        Past Surgical History:   Past Surgical History:   Procedure Laterality Date    CARDIAC CATHETERIZATION N/A 2024    Procedure: Left Heart Cath;  Surgeon: Jeremy Wolfe MD;  Location: [facility] CATH INVASIVE LOCATION;  Service: Cardiovascular;  Laterality: N/A;       Family History:   Family History   Problem Relation Age of Onset    Heart disease Mother     Heart disease Father     Prostate cancer Father        Social History:   Social History     Socioeconomic History    Marital status:    Tobacco Use    Smoking status: Former     Types: Cigarettes     Start date:      Quit date:      Years since quittin.9     Passive exposure: Past    Smokeless tobacco: Never    Tobacco comments:     vape   Vaping Use    Vaping status: Every Day    Substances: Nicotine   Substance and Sexual Activity    Alcohol use: Yes     Alcohol/week: 3.0 standard drinks of alcohol     Types: 3 Cans of beer per week    Drug use: Yes     Types: Marijuana    Sexual activity: Yes     Partners: Female       Medications:     Current Outpatient Medications:     aspirin 81 MG EC tablet, Take 1 tablet by mouth Daily., Disp: 90 tablet, Rfl: 3    carvedilol (COREG) 12.5 MG tablet, Take 1 tablet by mouth 2 (Two) Times a Day., Disp: 180 tablet, Rfl: 3    esomeprazole (nexIUM) 20 MG capsule, Take 1 capsule by mouth Every Morning Before Breakfast., Disp: 90 capsule, Rfl: 0    Farxiga 10 MG tablet, Take 10 mg by mouth Daily., Disp: 90 tablet, Rfl: 1    rosuvastatin (CRESTOR) 20 MG tablet, Take 1  "tablet by mouth Daily., Disp: 90 tablet, Rfl: 1    sacubitril-valsartan (Entresto) 24-26 MG tablet, Take 1 tablet by mouth 2 (Two) Times a Day., Disp: 180 tablet, Rfl: 1    Tirzepatide-Weight Management (ZEPBOUND) 5 MG/0.5ML solution auto-injector, Inject 0.5 mL under the skin into the appropriate area as directed 1 (One) Time Per Week., Disp: 6 mL, Rfl: 1    Allergies:   No Known Allergies      Physical Exam:  Vital Signs:   Vitals:    12/05/24 0936   BP: 102/72   BP Location: Left arm   Patient Position: Sitting   Cuff Size: Large Adult   Pulse: 68   SpO2: 98%   Weight: 116 kg (256 lb 4.8 oz)   Height: 182.9 cm (72.01\")     Body mass index is 34.75 kg/m².     Physical Exam    Procedures      Assessment/Plan:   Diagnoses and all orders for this visit:    1. Chronic kidney disease, stage 3a (Primary)  Assessment & Plan:  Ultrasound was okay.  Patient is instructed to not take any NSAIDs.  Medicines as directed.  Stay well-hydrated.  GFR was 63.      2. Dilated cardiomyopathy  Assessment & Plan:  Patient has dilated cardiomyopathy.  He has refused to wear the LifeVest.  We are to get him to lose weight.  He has a ejection fraction about 30 to 35%.  We stressed to him the importance of taking all his medicines and getting vaccines.  I am going to try and get him to stop vaping, monitor his blood pressure closely, and I will get him an appointment to see cardiology.  I would like them following him.    Orders:  -     Ambulatory Referral to Cardiology    3. Hypertension, essential  Assessment & Plan:  Discussed with patient to monitor their blood pressure and if systolic blood pressure goes above 140 or diastolic is above 90 to return to clinic.  Take medicines as directed, call for any problems, patient not having or any worrisome symptoms.          4. Morbid (severe) obesity due to excess calories  Assessment & Plan:  Patient's (There is no height or weight on file to calculate BMI.) indicates that they are obese (BMI " >30) with health conditions that include hypertension and coronary heart disease . Weight is unchanged. BMI  is above average; BMI management plan is completed. We discussed portion control and increasing exercise.    Patient needs to lose weight.  He has a dilated cardiomyopathy.  We started him on Zepbound 2.5 mg at last visit.  An increase in the Zepbound 5.0 mg as he is continued to lose weight.    Orders:  -     Tirzepatide-Weight Management (ZEPBOUND) 5 MG/0.5ML solution auto-injector; Inject 0.5 mL under the skin into the appropriate area as directed 1 (One) Time Per Week.  Dispense: 6 mL; Refill: 1    5. Screening for malignant neoplasm of colon  -     Ambulatory Referral For Screening Colonoscopy             Follow Up:   Return in about 3 months (around 3/5/2025) for Annual physical.      Thomas Christian MD  [facility]

## 2024-12-04 NOTE — ASSESSMENT & PLAN NOTE
Patient's (There is no height or weight on file to calculate BMI.) indicates that they are obese (BMI >30) with health conditions that include hypertension and coronary heart disease . Weight is unchanged. BMI  is above average; BMI management plan is completed. We discussed portion control and increasing exercise.    Patient needs to lose weight.  He has a dilated cardiomyopathy.  We started him on Zepbound 2.5 mg at last visit.  An increase in the Zepbound 5.0 mg as he is continued to lose weight.

## 2024-12-04 NOTE — ASSESSMENT & PLAN NOTE
Ultrasound was okay.  Patient is instructed to not take any NSAIDs.  Medicines as directed.  Stay well-hydrated.  GFR was 63.

## 2024-12-05 ENCOUNTER — OFFICE VISIT (OUTPATIENT)
Dept: FAMILY MEDICINE CLINIC | Facility: CLINIC | Age: 49
End: 2024-12-05
Payer: COMMERCIAL

## 2024-12-05 VITALS
WEIGHT: 256.3 LBS | DIASTOLIC BLOOD PRESSURE: 72 MMHG | BODY MASS INDEX: 34.72 KG/M2 | HEART RATE: 68 BPM | HEIGHT: 72 IN | SYSTOLIC BLOOD PRESSURE: 102 MMHG | OXYGEN SATURATION: 98 %

## 2024-12-05 DIAGNOSIS — I10 HYPERTENSION, ESSENTIAL: ICD-10-CM

## 2024-12-05 DIAGNOSIS — I42.0 DILATED CARDIOMYOPATHY: ICD-10-CM

## 2024-12-05 DIAGNOSIS — N18.31 CHRONIC KIDNEY DISEASE, STAGE 3A: Primary | ICD-10-CM

## 2024-12-05 DIAGNOSIS — E66.01 MORBID (SEVERE) OBESITY DUE TO EXCESS CALORIES: ICD-10-CM

## 2024-12-05 DIAGNOSIS — Z12.11 SCREENING FOR MALIGNANT NEOPLASM OF COLON: ICD-10-CM

## 2024-12-05 PROCEDURE — 99214 OFFICE O/P EST MOD 30 MIN: CPT | Performed by: FAMILY MEDICINE

## 2024-12-09 ENCOUNTER — PRIOR AUTHORIZATION (OUTPATIENT)
Dept: FAMILY MEDICINE CLINIC | Facility: CLINIC | Age: 49
End: 2024-12-09
Payer: COMMERCIAL

## 2025-01-07 ENCOUNTER — OFFICE VISIT (OUTPATIENT)
Dept: CARDIOLOGY | Facility: CLINIC | Age: 50
End: 2025-01-07
Payer: COMMERCIAL

## 2025-01-07 VITALS
HEIGHT: 72 IN | TEMPERATURE: 97 F | RESPIRATION RATE: 18 BRPM | HEART RATE: 70 BPM | OXYGEN SATURATION: 99 % | SYSTOLIC BLOOD PRESSURE: 110 MMHG | DIASTOLIC BLOOD PRESSURE: 72 MMHG | WEIGHT: 253 LBS | BODY MASS INDEX: 34.27 KG/M2

## 2025-01-07 DIAGNOSIS — I10 HYPERTENSION, ESSENTIAL: ICD-10-CM

## 2025-01-07 DIAGNOSIS — I35.1 NONRHEUMATIC AORTIC VALVE INSUFFICIENCY: ICD-10-CM

## 2025-01-07 DIAGNOSIS — I42.0 DILATED CARDIOMYOPATHY: Primary | ICD-10-CM

## 2025-01-07 PROBLEM — R01.1 MURMUR: Status: RESOLVED | Noted: 2024-07-02 | Resolved: 2025-01-07

## 2025-01-07 PROBLEM — R94.39 ABNORMAL NUCLEAR STRESS TEST: Status: RESOLVED | Noted: 2024-07-30 | Resolved: 2025-01-07

## 2025-01-07 PROBLEM — I35.0 AORTIC VALVE STENOSIS: Status: RESOLVED | Noted: 2024-08-02 | Resolved: 2025-01-07

## 2025-01-07 PROCEDURE — 93000 ELECTROCARDIOGRAM COMPLETE: CPT | Performed by: INTERNAL MEDICINE

## 2025-01-07 PROCEDURE — 99214 OFFICE O/P EST MOD 30 MIN: CPT | Performed by: INTERNAL MEDICINE

## 2025-01-07 RX ORDER — SPIRONOLACTONE 25 MG/1
25 TABLET ORAL DAILY
Qty: 90 TABLET | Refills: 3 | Status: SHIPPED | OUTPATIENT
Start: 2025-01-07

## 2025-01-07 RX ORDER — SACUBITRIL AND VALSARTAN 49; 51 MG/1; MG/1
1 TABLET, FILM COATED ORAL 2 TIMES DAILY
Qty: 180 TABLET | Refills: 2 | Status: SHIPPED | OUTPATIENT
Start: 2025-01-07

## 2025-01-07 NOTE — PROGRESS NOTES
[facility]  [facility]ARDIOLOGY  [address] [place] [address]  Dept: [phone]  Dept Fax: [phone]    Robson Babin  1975    Follow Up Office Visit Note    History of Present Illness:  Robson Babin is a 49 y.o. male who presents to the clinic for Follow-up. Non ischemic cardiomyopathy- He underwent cath with normal coronary arteries, , the Ef has improved to 325 in MRI ., no sure why he did not come back sooner, anyway, he is doing better, no SOB, no edema, has also possible bicuspid valve with moderate AI and also mild AS, , BP is 120.70, , will increase Entresto further to 49.51 bid, will keep Farxiga 10 mg, Coreg 12,5 bid and will add Aldactone 25 mg, the aorta is borderline dilated by MRI 40 MMM, EKG sinus HR 58    The following portions of the patient's history were reviewed and updated as appropriate: allergies, current medications, past family history, past medical history, past social history, past surgical history, and problem list.    Medications:  aspirin  carvedilol  Entresto tablet  esomeprazole  Farxiga tablet  rosuvastatin  spironolactone  Tirzepatide-Weight Management solution auto-injector    Subjective  No Known Allergies     Past Medical History:   Diagnosis Date   • Hypertension        Past Surgical History:   Procedure Laterality Date   • CARDIAC CATHETERIZATION N/A 2024    Procedure: Left Heart Cath;  Surgeon: Jeremy Wolfe MD;  Location: [facility] INVASIVE LOCATION;  Service: Cardiovascular;  Laterality: N/A;       Family History   Problem Relation Age of Onset   • Heart disease Mother    • Heart disease Father    • Prostate cancer Father         Social History     Socioeconomic History   • Marital status:    Tobacco Use   • Smoking status: Former     Types: Cigarettes     Start date:      Quit date:      Years since quittin.0     Passive exposure: Past   • Smokeless tobacco: Never   • Tobacco comments:  "    vape   Vaping Use   • Vaping status: Every Day   • Substances: Nicotine   Substance and Sexual Activity   • Alcohol use: Yes     Alcohol/week: 3.0 standard drinks of alcohol     Types: 3 Cans of beer per week   • Drug use: Yes     Types: Marijuana   • Sexual activity: Yes     Partners: Female       Review of Systems   Constitutional: Negative.    HENT: Negative.     Respiratory: Negative.     Cardiovascular: Negative.    Endocrine: Negative.    Genitourinary: Negative.    Musculoskeletal: Negative.    Skin: Negative.    Allergic/Immunologic: Negative.    Neurological: Negative.    Hematological: Negative.    Psychiatric/Behavioral: Negative.       Cardiovascular Procedures    ECHO/MUGA:  STRESS TESTS:   CARDIAC CATH:   DEVICES:   HOLTER:   CT/MRI:   VASCULAR:   CARDIOTHORACIC:     Objective  Vitals:    01/07/25 1534   BP: 110/72   BP Location: Right arm   Patient Position: Lying   Cuff Size: Adult   Pulse: 70   Resp: 18   Temp: 97 °F (36.1 °C)   TempSrc: Infrared   SpO2: 99%   Weight: 115 kg (253 lb)   Height: 182.9 cm (72.01\")   PainSc: 0-No pain     Body mass index is 34.3 kg/m².     Physical Exam  Vitals reviewed.   Constitutional:       Appearance: Healthy appearance. Not in distress.   Eyes:      Pupils: Pupils are equal, round, and reactive to light.   HENT:    Mouth/Throat:      Pharynx: Oropharynx is clear.   Neck:      Thyroid: Thyroid normal.      Vascular: No JVR. JVD normal.   Pulmonary:      Effort: Pulmonary effort is normal.      Breath sounds: Normal breath sounds. No wheezing. No rhonchi. No rales.   Chest:      Chest wall: Not tender to palpatation.   Cardiovascular:      PMI at left midclavicular line. Normal rate. Regular rhythm. Normal S1. Normal S2.       Murmurs: There is no murmur.      No gallop.  No click. No rub.   Pulses:     Intact distal pulses.      Carotid: 3+ bilaterally.     Radial: 3+ bilaterally.     Femoral: 3+ bilaterally.     Dorsalis pedis: 3+ bilaterally.     Posterior " tibial: 3+ bilaterally.  Edema:     Peripheral edema absent.   Abdominal:      General: Bowel sounds are normal.      Palpations: Abdomen is soft.      Tenderness: There is no abdominal tenderness.   Musculoskeletal: Normal range of motion.         General: No tenderness.      Cervical back: Normal range of motion and neck supple. Skin:     General: Skin is warm and dry.   Neurological:      General: No focal deficit present.      Mental Status: Alert and oriented to person, place and time.        Diagnostic Data    ECG 12 Lead    Date/Time: 1/7/2025 4:17 PM  Performed by: Rony Aldridge MD    Authorized by: Rony Aldridge MD  Comparison: compared with previous ECG from 7/2/2024  Similar to previous ECG  Rhythm: sinus rhythm and sinus bradycardia  Rate: normal  BPM: 58  Conduction: non-specific intraventricular conduction delay  QRS axis: normal  Other findings: left ventricular hypertrophy    Clinical impression: abnormal EKG        Assessment and Plan  Diagnoses and all orders for this visit:    Dilated cardiomyopathy- He feels good asymptomatic, Ef by MRI 32%, will increase Entresto to 49.51 bid, plus Add Aldactone 25 mg, will keep Coreg 12,5 bid and also Farxiga 10 mg, will need an echo in 2 months before next appointment, he has returned the lifevest due to cost  -     Cancel: Adult Transthoracic Echo Complete W/ Cont if Necessary Per Protocol; Future  -     Adult Transthoracic Echo Complete W/ Cont if Necessary Per Protocol; Future    Hypertension, essential- BP is 120.80 will keep failure meds    Nonrheumatic aortic valve insufficiency- moderate by AI  and also has mild AS  -     Cancel: Adult Transthoracic Echo Complete W/ Cont if Necessary Per Protocol; Future  -     Adult Transthoracic Echo Complete W/ Cont if Necessary Per Protocol; Future    Other orders  -     sacubitril-valsartan (Entresto) 49-51 MG tablet; Take 1 tablet by mouth 2 (Two) Times a Day.  -     spironolactone (ALDACTONE) 25  MG tablet; Take 1 tablet by mouth Daily.         Return in about 2 months (around 3/7/2025) for Recheck with Dr. Aldridge.    Rony Aldridge MD  01/07/2025

## 2025-01-09 ENCOUNTER — PRIOR AUTHORIZATION (OUTPATIENT)
Dept: FAMILY MEDICINE CLINIC | Facility: CLINIC | Age: 50
End: 2025-01-09
Payer: COMMERCIAL

## 2025-01-10 ENCOUNTER — PATIENT MESSAGE (OUTPATIENT)
Dept: FAMILY MEDICINE CLINIC | Facility: CLINIC | Age: 50
End: 2025-01-10
Payer: COMMERCIAL

## 2025-03-05 ENCOUNTER — OFFICE VISIT (OUTPATIENT)
Dept: CARDIOLOGY | Facility: CLINIC | Age: 50
End: 2025-03-05
Payer: COMMERCIAL

## 2025-03-05 VITALS
SYSTOLIC BLOOD PRESSURE: 136 MMHG | HEIGHT: 72 IN | WEIGHT: 248 LBS | HEART RATE: 90 BPM | DIASTOLIC BLOOD PRESSURE: 74 MMHG | OXYGEN SATURATION: 90 % | BODY MASS INDEX: 33.59 KG/M2 | TEMPERATURE: 97 F | RESPIRATION RATE: 16 BRPM

## 2025-03-05 DIAGNOSIS — I10 HYPERTENSION, ESSENTIAL: ICD-10-CM

## 2025-03-05 DIAGNOSIS — I35.1 NONRHEUMATIC AORTIC VALVE INSUFFICIENCY: ICD-10-CM

## 2025-03-05 DIAGNOSIS — E66.01 MORBID (SEVERE) OBESITY DUE TO EXCESS CALORIES: ICD-10-CM

## 2025-03-05 DIAGNOSIS — I42.0 DILATED CARDIOMYOPATHY: Primary | ICD-10-CM

## 2025-03-05 PROBLEM — Z91.89 AT RISK FOR SUDDEN CARDIAC DEATH: Status: RESOLVED | Noted: 2024-07-30 | Resolved: 2025-03-05

## 2025-03-05 RX ORDER — SACUBITRIL AND VALSARTAN 97; 103 MG/1; MG/1
1 TABLET, FILM COATED ORAL 2 TIMES DAILY
Qty: 180 TABLET | Refills: 3 | Status: SHIPPED | OUTPATIENT
Start: 2025-03-05

## 2025-03-05 RX ORDER — CARVEDILOL 25 MG/1
25 TABLET ORAL 2 TIMES DAILY
Qty: 180 TABLET | Refills: 3 | Status: SHIPPED | OUTPATIENT
Start: 2025-03-05

## 2025-03-05 NOTE — PROGRESS NOTES
Patient Name: Robson Bbain  : 1975   MRN: [id number]     Chief Complaint:    Chief Complaint   Patient presents with    ckd 3     Hyperlipidemia    Hypertension       History of Present Illness: Robson Babin is a 49 y.o. male who is here today for follow up on dilated cardiomyopathy and hypertension  HPI        Review of Systems:   Review of Systems   Constitutional: Negative.    HENT: Negative.     Eyes: Negative.    Respiratory: Negative.     Cardiovascular: Negative.    Gastrointestinal: Negative.    Neurological: Negative.         Past Medical History:   Past Medical History:   Diagnosis Date    Hypertension        Past Surgical History:   Past Surgical History:   Procedure Laterality Date    CARDIAC CATHETERIZATION N/A 2024    Procedure: Left Heart Cath;  Surgeon: Jeremy Wolfe MD;  Location: [facility] CATH INVASIVE LOCATION;  Service: Cardiovascular;  Laterality: N/A;       Family History:   Family History   Problem Relation Age of Onset    Heart disease Mother     Heart disease Father     Prostate cancer Father        Social History:   Social History     Socioeconomic History    Marital status:    Tobacco Use    Smoking status: Former     Types: Cigarettes     Start date:      Quit date:      Years since quittin.1     Passive exposure: Past    Smokeless tobacco: Never    Tobacco comments:     vape   Vaping Use    Vaping status: Every Day    Substances: Nicotine   Substance and Sexual Activity    Alcohol use: Yes     Alcohol/week: 3.0 standard drinks of alcohol     Types: 3 Cans of beer per week    Drug use: Yes     Types: Marijuana    Sexual activity: Yes     Partners: Female       Medications:     Current Outpatient Medications:     aspirin 81 MG EC tablet, Take 1 tablet by mouth Daily., Disp: 90 tablet, Rfl: 3    carvedilol (COREG) 25 MG tablet, Take 1 tablet by mouth 2 (Two) Times a Day., Disp: 180 tablet, Rfl: 3    esomeprazole (nexIUM) 20 MG capsule, TAKE ONE  "CAPSULE BY MOUTH EVERY MORNING BEFORE BREAKFAST, Disp: 90 capsule, Rfl: 0    Farxiga 10 MG tablet, Take 10 mg by mouth Daily., Disp: 90 tablet, Rfl: 1    rosuvastatin (CRESTOR) 20 MG tablet, Take 1 tablet by mouth Daily., Disp: 90 tablet, Rfl: 1    sacubitril-valsartan (Entresto)  MG tablet, Take 1 tablet by mouth 2 (Two) Times a Day., Disp: 180 tablet, Rfl: 3    spironolactone (ALDACTONE) 25 MG tablet, Take 1 tablet by mouth Daily., Disp: 90 tablet, Rfl: 3    Allergies:   No Known Allergies      Physical Exam:  Vital Signs:   Vitals:    03/06/25 1439   BP: 148/72   BP Location: Left arm   Patient Position: Sitting   Cuff Size: Large Adult   Pulse: 68   Resp: 16   SpO2: 98%   Weight: 113 kg (249 lb 9.6 oz)   Height: 182.9 cm (72.01\")   PainSc: 0-No pain     Body mass index is 33.84 kg/m².     Physical Exam  Vitals and nursing note reviewed.   Constitutional:       Appearance: Normal appearance. He is normal weight.   HENT:      Head: Normocephalic and atraumatic.      Right Ear: Tympanic membrane, ear canal and external ear normal.      Left Ear: Tympanic membrane, ear canal and external ear normal.      Nose: Nose normal.      Mouth/Throat:      Mouth: Mucous membranes are dry.      Pharynx: Oropharynx is clear.   Eyes:      Extraocular Movements: Extraocular movements intact.      Conjunctiva/sclera: Conjunctivae normal.      Pupils: Pupils are equal, round, and reactive to light.   Cardiovascular:      Rate and Rhythm: Normal rate and regular rhythm.      Pulses: Normal pulses.      Heart sounds: Normal heart sounds.   Pulmonary:      Effort: Pulmonary effort is normal.      Breath sounds: Normal breath sounds.   Musculoskeletal:      Cervical back: Normal range of motion and neck supple.   Feet:      Comments:      Neurological:      Mental Status: He is alert.         Procedures      Assessment/Plan:   Diagnoses and all orders for this visit:    1. Dilated cardiomyopathy (Primary)  Assessment & Plan:  Has " seen cardiology    Orders:  -     Hemoglobin A1c; Future  -     Lipid Panel; Future  -     Comprehensive Metabolic Panel; Future  -     Vitamin B12; Future  -     Vitamin D,25-Hydroxy; Future  -     TSH Rfx On Abnormal To Free T4; Future  -     CBC & Differential; Future    2. Hypertension, essential  Assessment & Plan:  Discussed with patient to monitor their blood pressure and if systolic blood pressure goes above 140 or diastolic is above 90 to return to clinic.  Take medicines as directed, call for any problems, patient not having or any worrisome symptoms.        Orders:  -     Hemoglobin A1c; Future  -     Lipid Panel; Future  -     Comprehensive Metabolic Panel; Future  -     Vitamin B12; Future  -     Vitamin D,25-Hydroxy; Future  -     TSH Rfx On Abnormal To Free T4; Future  -     CBC & Differential; Future    3. Chronic kidney disease, stage 3a  Assessment & Plan:  Ultrasound was okay.  Patient is instructed to not take any NSAIDs.  Medicines as directed.  Stay well-hydrated.  GFR was 63.    Orders:  -     Hemoglobin A1c; Future  -     Lipid Panel; Future  -     Comprehensive Metabolic Panel; Future  -     Vitamin B12; Future  -     Vitamin D,25-Hydroxy; Future  -     TSH Rfx On Abnormal To Free T4; Future  -     CBC & Differential; Future    4. Morbid (severe) obesity due to excess calories  Assessment & Plan:  Patient's (Body mass index is 33.84 kg/m².) indicates that they are obese (BMI >30) with health conditions that include hypertension and coronary heart disease . Weight is unchanged. BMI  is above average; BMI management plan is completed. We discussed portion control and increasing exercise.    I had patient on Zepbound.  His insurance would not pay for it.  He has dropped 8 pounds by diet and exercise.  With his history of cardiomyopathy weight loss is important.  I told him as long as he keeps losing weight I will just follow him.  But if he plateaus and cannot lose weight and we will start him  back on Zepbound as I want him around 220.    Orders:  -     Hemoglobin A1c; Future  -     Lipid Panel; Future  -     Comprehensive Metabolic Panel; Future  -     Vitamin B12; Future  -     Vitamin D,25-Hydroxy; Future  -     TSH Rfx On Abnormal To Free T4; Future  -     CBC & Differential; Future             Follow Up:   Return in about 3 months (around 6/6/2025) for Annual physical, Bloodwork 1 week prior to next appointment.      Thomas Christian MD  [facility]

## 2025-03-05 NOTE — PROGRESS NOTES
[facility] [place]  [facility] CARDIOLOGY  [address] [place] [address]  Dept: [phone]  Dept Fax: [phone]    Robson Babin  1975    Follow Up Office Visit Note    History of Present Illness:  Robson Babin is a 49 y.o. male who presents to the clinic for Follow-up. Non ischemic cardiomyopathy- He seems doing better, more energy.,  will titrate  more the Entresto to 97,103 bid as well as the Coreg to 25 bid, will keep Farxiga 10 mg and also Aldactone 25mg, will get lab today, EKG today sinus HR 73    The following portions of the patient's history were reviewed and updated as appropriate: allergies, current medications, past family history, past medical history, past social history, past surgical history, and problem list.    Medications:  aspirin  carvedilol  esomeprazole  Farxiga tablet  rosuvastatin  sacubitril-valsartan  Sod Picosulfate-Mag Ox-Cit Acd solution  spironolactone  Tirzepatide-Weight Management solution auto-injector    Subjective  No Known Allergies     Past Medical History:   Diagnosis Date    Hypertension        Past Surgical History:   Procedure Laterality Date    CARDIAC CATHETERIZATION N/A 2024    Procedure: Left Heart Cath;  Surgeon: Jeremy Wolfe MD;  Location: [facility] CATH INVASIVE LOCATION;  Service: Cardiovascular;  Laterality: N/A;       Family History   Problem Relation Age of Onset    Heart disease Mother     Heart disease Father     Prostate cancer Father         Social History     Socioeconomic History    Marital status:    Tobacco Use    Smoking status: Former     Types: Cigarettes     Start date:      Quit date:      Years since quittin.1     Passive exposure: Past    Smokeless tobacco: Never    Tobacco comments:     vape   Vaping Use    Vaping status: Every Day    Substances: Nicotine   Substance and Sexual Activity    Alcohol use: Yes     Alcohol/week: 3.0 standard drinks of alcohol     Types: 3 Cans of beer  "per week    Drug use: Yes     Types: Marijuana    Sexual activity: Yes     Partners: Female       Review of Systems   Constitutional: Negative.    HENT: Negative.     Respiratory: Negative.     Cardiovascular: Negative.    Endocrine: Negative.    Genitourinary: Negative.    Musculoskeletal: Negative.    Skin: Negative.    Allergic/Immunologic: Negative.    Neurological: Negative.    Hematological: Negative.    Psychiatric/Behavioral: Negative.       Cardiovascular Procedures    ECHO/MUGA:  STRESS TESTS:   CARDIAC CATH:   DEVICES:   HOLTER:   CT/MRI:   VASCULAR:   CARDIOTHORACIC:     Objective  Vitals:    03/05/25 1447   BP: 136/74   BP Location: Right arm   Patient Position: Sitting   Cuff Size: Adult   Pulse: 90   Resp: 16   Temp: 97 °F (36.1 °C)   TempSrc: Infrared   SpO2: 90%   Weight: 112 kg (248 lb)   Height: 182.9 cm (72.01\")   PainSc: 0-No pain     Body mass index is 33.63 kg/m².     Physical Exam  Vitals reviewed.   Constitutional:       Appearance: Healthy appearance. Not in distress.   Eyes:      Pupils: Pupils are equal, round, and reactive to light.   HENT:    Mouth/Throat:      Pharynx: Oropharynx is clear.   Neck:      Thyroid: Thyroid normal.      Vascular: No JVR. JVD normal.   Pulmonary:      Effort: Pulmonary effort is normal.      Breath sounds: Normal breath sounds. No wheezing. No rhonchi. No rales.   Chest:      Chest wall: Not tender to palpatation.   Cardiovascular:      PMI at left midclavicular line. Normal rate. Regular rhythm. Normal S1. Normal S2.       Murmurs: There is no murmur.      No gallop.  No click. No rub.   Pulses:     Intact distal pulses.      Carotid: 3+ bilaterally.     Radial: 3+ bilaterally.     Femoral: 3+ bilaterally.     Dorsalis pedis: 3+ bilaterally.     Posterior tibial: 3+ bilaterally.  Edema:     Peripheral edema absent.   Abdominal:      General: Bowel sounds are normal.      Palpations: Abdomen is soft.      Tenderness: There is no abdominal tenderness. "   Musculoskeletal: Normal range of motion.         General: No tenderness.      Cervical back: Normal range of motion and neck supple. Skin:     General: Skin is warm and dry.   Neurological:      General: No focal deficit present.      Mental Status: Alert and oriented to person, place and time.        Diagnostic Data  Procedures    Assessment and Plan  Diagnoses and all orders for this visit:    Dilated cardiomyopathy- Doing good, feels better., no edema, no SOB, will maximized mediacl treatment Entresto 97.,103 bid, coreg 25 bid, Farxiga 10 mg and  Aldactone 25 mg, will get lab today   -     Basic Metabolic Panel    Nonrheumatic aortic valve insufficiency  -     Basic Metabolic Panel -Mild AI and mild AS and bicuspid valve     Hypertension, essential- BP is 120.80, will keep failure meds  -     Basic Metabolic Panel    Morbid (severe) obesity due to excess calories    Other orders  -     carvedilol (COREG) 25 MG tablet; Take 1 tablet by mouth 2 (Two) Times a Day.  -     sacubitril-valsartan (Entresto)  MG tablet; Take 1 tablet by mouth 2 (Two) Times a Day.         Return in about 4 months (around 7/5/2025) for Recheck with Dr. Aldridge.    Rony Aldridge MD  03/05/2025

## 2025-03-06 ENCOUNTER — TELEPHONE (OUTPATIENT)
Dept: CARDIOLOGY | Facility: CLINIC | Age: 50
End: 2025-03-06
Payer: COMMERCIAL

## 2025-03-06 ENCOUNTER — OFFICE VISIT (OUTPATIENT)
Dept: FAMILY MEDICINE CLINIC | Facility: CLINIC | Age: 50
End: 2025-03-06
Payer: COMMERCIAL

## 2025-03-06 VITALS
HEART RATE: 68 BPM | SYSTOLIC BLOOD PRESSURE: 148 MMHG | HEIGHT: 72 IN | RESPIRATION RATE: 16 BRPM | BODY MASS INDEX: 33.81 KG/M2 | DIASTOLIC BLOOD PRESSURE: 72 MMHG | WEIGHT: 249.6 LBS | OXYGEN SATURATION: 98 %

## 2025-03-06 DIAGNOSIS — E66.01 MORBID (SEVERE) OBESITY DUE TO EXCESS CALORIES: ICD-10-CM

## 2025-03-06 DIAGNOSIS — N18.31 CHRONIC KIDNEY DISEASE, STAGE 3A: ICD-10-CM

## 2025-03-06 DIAGNOSIS — I10 HYPERTENSION, ESSENTIAL: ICD-10-CM

## 2025-03-06 DIAGNOSIS — I42.0 DILATED CARDIOMYOPATHY: Primary | ICD-10-CM

## 2025-03-06 LAB
BUN SERPL-MCNC: 22 MG/DL (ref 6–24)
BUN/CREAT SERPL: 17 (ref 9–20)
CALCIUM SERPL-MCNC: 9.1 MG/DL (ref 8.7–10.2)
CHLORIDE SERPL-SCNC: 101 MMOL/L (ref 96–106)
CO2 SERPL-SCNC: 23 MMOL/L (ref 20–29)
CREAT SERPL-MCNC: 1.32 MG/DL (ref 0.76–1.27)
EGFRCR SERPLBLD CKD-EPI 2021: 66 ML/MIN/1.73
GLUCOSE SERPL-MCNC: 88 MG/DL (ref 70–99)
POTASSIUM SERPL-SCNC: 4 MMOL/L (ref 3.5–5.2)
SODIUM SERPL-SCNC: 140 MMOL/L (ref 134–144)

## 2025-03-06 NOTE — TELEPHONE ENCOUNTER
Call placed to patient to discuss  results. No answer, left voicemail requesting call back.   OK for Hub to relay-  Lab are good, keep same meds K is good creatinine steady 1,3

## 2025-03-06 NOTE — TELEPHONE ENCOUNTER
----- Message from Jackie LUJAN sent at 3/6/2025 12:31 PM EST -----    ----- Message -----  From: Rony Aldridge MD  Sent: 3/6/2025   9:15 AM EST  To: Jackie Kessler RN    Lab are good, keep same meds K is good creatinine steady 1,3

## 2025-03-06 NOTE — ASSESSMENT & PLAN NOTE
Patient's (Body mass index is 33.84 kg/m².) indicates that they are obese (BMI >30) with health conditions that include hypertension and coronary heart disease . Weight is unchanged. BMI  is above average; BMI management plan is completed. We discussed portion control and increasing exercise.    I had patient on Zepbound.  His insurance would not pay for it.  He has dropped 8 pounds by diet and exercise.  With his history of cardiomyopathy weight loss is important.  I told him as long as he keeps losing weight I will just follow him.  But if he plateaus and cannot lose weight and we will start him back on Zepbound as I want him around 220.

## 2025-06-12 ENCOUNTER — LAB (OUTPATIENT)
Dept: FAMILY MEDICINE CLINIC | Facility: CLINIC | Age: 50
End: 2025-06-12
Payer: COMMERCIAL

## 2025-06-12 DIAGNOSIS — N18.31 CHRONIC KIDNEY DISEASE, STAGE 3A: ICD-10-CM

## 2025-06-12 DIAGNOSIS — I10 HYPERTENSION, ESSENTIAL: ICD-10-CM

## 2025-06-12 DIAGNOSIS — I42.0 DILATED CARDIOMYOPATHY: ICD-10-CM

## 2025-06-12 DIAGNOSIS — E66.01 MORBID (SEVERE) OBESITY DUE TO EXCESS CALORIES: ICD-10-CM

## 2025-06-13 LAB
25(OH)D3+25(OH)D2 SERPL-MCNC: 60.2 NG/ML (ref 30–100)
ALBUMIN SERPL-MCNC: 4 G/DL (ref 4.1–5.1)
ALP SERPL-CCNC: 66 IU/L (ref 44–121)
ALT SERPL-CCNC: 35 IU/L (ref 0–44)
AST SERPL-CCNC: 33 IU/L (ref 0–40)
BASOPHILS # BLD AUTO: 0 X10E3/UL (ref 0–0.2)
BASOPHILS NFR BLD AUTO: 1 %
BILIRUB SERPL-MCNC: 0.4 MG/DL (ref 0–1.2)
BUN SERPL-MCNC: 16 MG/DL (ref 6–24)
BUN/CREAT SERPL: 12 (ref 9–20)
CALCIUM SERPL-MCNC: 9 MG/DL (ref 8.7–10.2)
CHLORIDE SERPL-SCNC: 106 MMOL/L (ref 96–106)
CHOLEST SERPL-MCNC: 106 MG/DL (ref 100–199)
CO2 SERPL-SCNC: 19 MMOL/L (ref 20–29)
CREAT SERPL-MCNC: 1.29 MG/DL (ref 0.76–1.27)
EGFRCR SERPLBLD CKD-EPI 2021: 68 ML/MIN/1.73
EOSINOPHIL # BLD AUTO: 0.1 X10E3/UL (ref 0–0.4)
EOSINOPHIL NFR BLD AUTO: 2 %
ERYTHROCYTE [DISTWIDTH] IN BLOOD BY AUTOMATED COUNT: 12.9 % (ref 11.6–15.4)
GLOBULIN SER CALC-MCNC: 2.6 G/DL (ref 1.5–4.5)
GLUCOSE SERPL-MCNC: 93 MG/DL (ref 70–99)
HBA1C MFR BLD: 5.6 % (ref 4.8–5.6)
HCT VFR BLD AUTO: 45 % (ref 37.5–51)
HDLC SERPL-MCNC: 34 MG/DL
HGB BLD-MCNC: 14.4 G/DL (ref 13–17.7)
IMM GRANULOCYTES # BLD AUTO: 0.1 X10E3/UL (ref 0–0.1)
IMM GRANULOCYTES NFR BLD AUTO: 1 %
LDLC SERPL CALC-MCNC: 47 MG/DL (ref 0–99)
LYMPHOCYTES # BLD AUTO: 1.7 X10E3/UL (ref 0.7–3.1)
LYMPHOCYTES NFR BLD AUTO: 20 %
MCH RBC QN AUTO: 30.8 PG (ref 26.6–33)
MCHC RBC AUTO-ENTMCNC: 32 G/DL (ref 31.5–35.7)
MCV RBC AUTO: 96 FL (ref 79–97)
MONOCYTES # BLD AUTO: 0.9 X10E3/UL (ref 0.1–0.9)
MONOCYTES NFR BLD AUTO: 12 %
NEUTROPHILS # BLD AUTO: 5.3 X10E3/UL (ref 1.4–7)
NEUTROPHILS NFR BLD AUTO: 64 %
PLATELET # BLD AUTO: 214 X10E3/UL (ref 150–450)
POTASSIUM SERPL-SCNC: 4.4 MMOL/L (ref 3.5–5.2)
PROT SERPL-MCNC: 6.6 G/DL (ref 6–8.5)
RBC # BLD AUTO: 4.67 X10E6/UL (ref 4.14–5.8)
SODIUM SERPL-SCNC: 141 MMOL/L (ref 134–144)
TRIGL SERPL-MCNC: 145 MG/DL (ref 0–149)
TSH SERPL DL<=0.005 MIU/L-ACNC: 0.73 UIU/ML (ref 0.45–4.5)
VIT B12 SERPL-MCNC: 408 PG/ML (ref 232–1245)
VLDLC SERPL CALC-MCNC: 25 MG/DL (ref 5–40)
WBC # BLD AUTO: 8.1 X10E3/UL (ref 3.4–10.8)

## 2025-06-20 ENCOUNTER — OFFICE VISIT (OUTPATIENT)
Dept: FAMILY MEDICINE CLINIC | Facility: CLINIC | Age: 50
End: 2025-06-20
Payer: COMMERCIAL

## 2025-06-20 VITALS
BODY MASS INDEX: 33.81 KG/M2 | HEIGHT: 72 IN | WEIGHT: 249.6 LBS | TEMPERATURE: 97.5 F | SYSTOLIC BLOOD PRESSURE: 110 MMHG | OXYGEN SATURATION: 97 % | DIASTOLIC BLOOD PRESSURE: 70 MMHG | HEART RATE: 69 BPM

## 2025-06-20 DIAGNOSIS — I35.0 AORTIC VALVE STENOSIS, ETIOLOGY OF CARDIAC VALVE DISEASE UNSPECIFIED: ICD-10-CM

## 2025-06-20 DIAGNOSIS — K21.9 GASTROESOPHAGEAL REFLUX DISEASE, UNSPECIFIED WHETHER ESOPHAGITIS PRESENT: Primary | ICD-10-CM

## 2025-06-20 DIAGNOSIS — I42.0 DILATED CARDIOMYOPATHY: ICD-10-CM

## 2025-06-20 DIAGNOSIS — I10 HYPERTENSION, ESSENTIAL: ICD-10-CM

## 2025-06-20 DIAGNOSIS — Z79.899 HIGH RISK MEDICATION USE: ICD-10-CM

## 2025-06-20 DIAGNOSIS — E66.01 MORBID (SEVERE) OBESITY DUE TO EXCESS CALORIES: ICD-10-CM

## 2025-06-20 DIAGNOSIS — R73.9 HYPERGLYCEMIA: ICD-10-CM

## 2025-06-20 DIAGNOSIS — R79.89 ELEVATED SERUM CREATININE: ICD-10-CM

## 2025-06-20 NOTE — PROGRESS NOTES
Follow Up Office Visit      Patient Name: Robson Babin  : 1975   MRN: [id number]     Chief Complaint:    Chief Complaint   Patient presents with    [facility]    Hyperlipidemia    Hypertension       History of Present Illness: Robson Babin is a 49 y.o. male who is here today to   change providers.  He relates that he is due for refills on his Nexium.  For his heartburn but has never had an EGD done    He was going to get a colonoscopy as recommended by Dr. Christian but was too busy and had to cancel that but will reschedule later in the year    Does see Dr. Aldridge for his dilated cardiomyopathy and feels like he is doing much better now On all his various heart medicines.  Medications list updated    He relates he gets lots of steps at his work--meeting Dr. Aldridge's goal of 10,000 steps a day.    He relates he takes some over-the-counter testosterone booster and prostate pills and those are doing well for him.    Labs reviewed with him creatinine has improved but still elevated 1.29    A1c 5.6    Cholesterol numbers are great with a total cholesterol 104 and LDL 47  History of Present Illness        Subjective      Review of Systems:   Review of Systems  Review of Systems   Constitutional: Negative for fatigue and fever.   Respiratory: Negative for cough and shortness of breath.    Cardiovascular: Negative for chest pain and palpitations.   Skin: Negative for rash or itching  No GI blood loss or melena no food sticking or dysphagia   he relates that his weight is down from 270 and has been trying to drop some weight.  Past Medical History:   Past Medical History:   Diagnosis Date    Hypertension        Past Surgical History:   Past Surgical History:   Procedure Laterality Date    CARDIAC CATHETERIZATION N/A 2024    Procedure: Left Heart Cath;  Surgeon: Jeremy Wolfe MD;  Location: [facility] INVASIVE LOCATION;  Service: Cardiovascular;  Laterality: N/A;       Family History:   Family  "History   Problem Relation Age of Onset    Heart disease Mother     Heart disease Father     Prostate cancer Father        Social History:   Social History     Socioeconomic History    Marital status:    Tobacco Use    Smoking status: Former     Types: Cigarettes     Start date:      Quit date:      Years since quittin.4     Passive exposure: Past    Smokeless tobacco: Never    Tobacco comments:     vape   Vaping Use    Vaping status: Every Day    Substances: Nicotine   Substance and Sexual Activity    Alcohol use: Yes     Alcohol/week: 3.0 standard drinks of alcohol     Types: 3 Cans of beer per week    Drug use: Yes     Types: Marijuana    Sexual activity: Yes     Partners: Female       Medications:     Current Outpatient Medications:     aspirin 81 MG EC tablet, Take 1 tablet by mouth Daily., Disp: 90 tablet, Rfl: 3    carvedilol (COREG) 25 MG tablet, Take 1 tablet by mouth 2 (Two) Times a Day., Disp: 180 tablet, Rfl: 3    esomeprazole (nexIUM) 20 MG capsule, Take 1 capsule by mouth Every Morning Before Breakfast., Disp: 90 capsule, Rfl: 1    Farxiga 10 MG tablet, Take 10 mg by mouth Daily., Disp: 90 tablet, Rfl: 1    rosuvastatin (CRESTOR) 20 MG tablet, Take 1 tablet by mouth Daily., Disp: 90 tablet, Rfl: 1    sacubitril-valsartan (Entresto)  MG tablet, Take 1 tablet by mouth 2 (Two) Times a Day., Disp: 180 tablet, Rfl: 3    Sod Picosulfate-Mag Ox-Cit Acd 10-3.5-12 MG-GM -GM/160ML solution, Take 350 mL by mouth Take As Directed for 1 dose., Disp: 350 mL, Rfl: 0    spironolactone (ALDACTONE) 25 MG tablet, Take 1 tablet by mouth Daily., Disp: 90 tablet, Rfl: 3    Allergies:   No Known Allergies    Objective     Physical Exam:  Vital Signs:   Vitals:    25 1429   BP: 110/70   Pulse: 69   Temp: 97.5 °F (36.4 °C)   TempSrc: Infrared   SpO2: 97%   Weight: 113 kg (249 lb 9.6 oz)   Height: 182.9 cm (72.01\")   PainSc: 0-No pain     Facility age limit for growth %luis f is 20 years.  Body " mass index is 33.84 kg/m².     Physical Exam  Vitals and nursing note reviewed.   Constitutional:       Appearance: Normal appearance.   HENT:      Head: Normocephalic and atraumatic.      Nose: Nose normal.      Mouth/Throat:      Mouth: Mucous membranes are moist.      Pharynx: Oropharynx is clear. No posterior oropharyngeal erythema.   Cardiovascular:      Rate and Rhythm: Normal rate and regular rhythm.   Pulmonary:      Effort: Pulmonary effort is normal.      Breath sounds: Normal breath sounds.   Abdominal:      Palpations: Abdomen is soft.      Tenderness: There is no abdominal tenderness.   Musculoskeletal:         General: Normal range of motion.      Cervical back: Normal range of motion and neck supple.      Right lower leg: No edema.      Left lower leg: No edema.   Skin:     General: Skin is warm and dry.   Neurological:      General: No focal deficit present.      Mental Status: He is alert.   Psychiatric:         Mood and Affect: Mood normal.         Behavior: Behavior normal.         Procedures    PHQ-9 Total Score:      Assessment / Plan      Assessment/Plan:   Diagnoses and all orders for this visit:    1. Gastroesophageal reflux disease, unspecified whether esophagitis present (Primary)  -     esomeprazole (nexIUM) 20 MG capsule; Take 1 capsule by mouth Every Morning Before Breakfast.  Dispense: 90 capsule; Refill: 1    2. Dilated cardiomyopathy    3. Hypertension, essential    4. Morbid (severe) obesity due to excess calories    5. Aortic valve stenosis, etiology of cardiac valve disease unspecified    6. High risk medication use  -     CBC Auto Differential; Future  -     Comprehensive Metabolic Panel; Future    7. Hyperglycemia  -     Hemoglobin A1c; Future    8. Elevated serum creatinine       Continue Nexium as directed    Continue follow-ups with cardiology as directed    Encourage good diet and exercise A1c is 5.6 we will keep an eye as he is headed towards prediabetes    He relates he has  a strong family history of heart disease in both parents.    Echo results reviewed showed EF of 41 to 45% and mild aortic stenosis    Follow-up 6 months with repeat blood work.  Assessment & Plan             Follow Up:   Return in about 6 months (around 12/20/2025) for Recheck, Labs prior next visit.            Marvin Youssef MD  [facility]   Portions of note created with Dragon voice recognition technology

## 2025-07-07 ENCOUNTER — OFFICE VISIT (OUTPATIENT)
Dept: CARDIOLOGY | Facility: CLINIC | Age: 50
End: 2025-07-07
Payer: COMMERCIAL

## 2025-07-07 VITALS
RESPIRATION RATE: 17 BRPM | DIASTOLIC BLOOD PRESSURE: 78 MMHG | WEIGHT: 250 LBS | HEART RATE: 76 BPM | OXYGEN SATURATION: 97 % | HEIGHT: 72 IN | BODY MASS INDEX: 33.86 KG/M2 | SYSTOLIC BLOOD PRESSURE: 128 MMHG

## 2025-07-07 DIAGNOSIS — I42.0 DILATED CARDIOMYOPATHY: Primary | ICD-10-CM

## 2025-07-07 DIAGNOSIS — I10 HYPERTENSION, ESSENTIAL: ICD-10-CM

## 2025-07-07 DIAGNOSIS — I35.1 NONRHEUMATIC AORTIC VALVE INSUFFICIENCY: ICD-10-CM

## 2025-07-07 DIAGNOSIS — E66.01 MORBID (SEVERE) OBESITY DUE TO EXCESS CALORIES: ICD-10-CM

## 2025-07-07 PROCEDURE — 93000 ELECTROCARDIOGRAM COMPLETE: CPT | Performed by: INTERNAL MEDICINE

## 2025-07-07 PROCEDURE — 99214 OFFICE O/P EST MOD 30 MIN: CPT | Performed by: INTERNAL MEDICINE

## 2025-07-07 RX ORDER — ROSUVASTATIN CALCIUM 20 MG/1
20 TABLET, COATED ORAL DAILY
Qty: 90 TABLET | Refills: 3 | Status: SHIPPED | OUTPATIENT
Start: 2025-07-07

## 2025-07-07 RX ORDER — DAPAGLIFLOZIN 10 MG/1
10 TABLET, FILM COATED ORAL DAILY
Qty: 90 TABLET | Refills: 3 | Status: SHIPPED | OUTPATIENT
Start: 2025-07-07

## 2025-07-07 NOTE — PROGRESS NOTES
[facility] [place]  [facility] CARDIOLOGY  [address] [place] [address]  Dept: [phone]  Dept Fax: [phone]    Robson Babin  1975    Follow Up Office Visit Note    History of Present Illness:  Robson Babin is a 49 y.o. male who presents to the clinic for Cardiomyopathy. Non ischemic, normal coronary arteries by cath, Ef by MRI 32 %, , last echo  Ef 40 to 45% denies any complaints, feels good, has not lost weight on Coreg 25 bid,Entresto 97.013 bid Farxiga 10 mg and Aldactone 25 mg EKG sinus Hr 64    The following portions of the patient's history were reviewed and updated as appropriate: allergies, current medications, past family history, past medical history, past social history, past surgical history, and problem list.    Medications:  aspirin  carvedilol  esomeprazole  Farxiga tablet  rosuvastatin  sacubitril-valsartan  Sod Picosulfate-Mag Ox-Cit Acd solution  spironolactone    Subjective  No Known Allergies     Past Medical History:   Diagnosis Date   • Hypertension        Past Surgical History:   Procedure Laterality Date   • CARDIAC CATHETERIZATION N/A 2024    Procedure: Left Heart Cath;  Surgeon: Jeremy Wolfe MD;  Location: [facility] INVASIVE LOCATION;  Service: Cardiovascular;  Laterality: N/A;       Family History   Problem Relation Age of Onset   • Heart disease Mother    • Heart disease Father    • Prostate cancer Father         Social History     Socioeconomic History   • Marital status:    Tobacco Use   • Smoking status: Former     Types: Cigarettes     Start date:      Quit date:      Years since quittin.5     Passive exposure: Past   • Smokeless tobacco: Never   • Tobacco comments:     vape   Vaping Use   • Vaping status: Every Day   • Substances: Nicotine   Substance and Sexual Activity   • Alcohol use: Yes     Alcohol/week: 3.0 standard drinks of alcohol     Types: 3 Cans of beer per week   • Drug use: Yes      "Types: Marijuana   • Sexual activity: Yes     Partners: Female       Review of Systems   Constitutional: Negative.    HENT: Negative.     Respiratory: Negative.     Cardiovascular: Negative.    Endocrine: Negative.    Genitourinary: Negative.    Musculoskeletal: Negative.    Skin: Negative.    Allergic/Immunologic: Negative.    Neurological: Negative.    Hematological: Negative.    Psychiatric/Behavioral: Negative.       Cardiovascular Procedures    ECHO/MUGA:  STRESS TESTS:   CARDIAC CATH:   DEVICES:   HOLTER:   CT/MRI:   VASCULAR:   CARDIOTHORACIC:     Objective  Vitals:    07/07/25 1542   BP: 128/78   BP Location: Left arm   Patient Position: Sitting   Cuff Size: Adult   Pulse: 76   Resp: 17   SpO2: 97%   Weight: 113 kg (250 lb)   Height: 182.9 cm (72.01\")   PainSc: 4    PainLoc: Back     Body mass index is 33.9 kg/m².     Physical Exam  Vitals reviewed.   Constitutional:       Appearance: Healthy appearance. Not in distress.   Eyes:      Pupils: Pupils are equal, round, and reactive to light.   HENT:    Mouth/Throat:      Pharynx: Oropharynx is clear.   Neck:      Thyroid: Thyroid normal.      Vascular: No JVR. JVD normal.   Pulmonary:      Effort: Pulmonary effort is normal.      Breath sounds: Normal breath sounds. No wheezing. No rhonchi. No rales.   Chest:      Chest wall: Not tender to palpatation.   Cardiovascular:      PMI at left midclavicular line. Normal rate. Regular rhythm. Normal S1. Normal S2.       Murmurs: There is no murmur.      No gallop.  No click. No rub.   Pulses:     Intact distal pulses.      Carotid: 3+ bilaterally.     Radial: 3+ bilaterally.     Femoral: 3+ bilaterally.     Dorsalis pedis: 3+ bilaterally.     Posterior tibial: 3+ bilaterally.  Edema:     Peripheral edema absent.   Abdominal:      General: Bowel sounds are normal.      Palpations: Abdomen is soft.      Tenderness: There is no abdominal tenderness.   Musculoskeletal: Normal range of motion.         General: No " tenderness.      Cervical back: Normal range of motion and neck supple. Skin:     General: Skin is warm and dry.   Neurological:      General: No focal deficit present.      Mental Status: Alert and oriented to person, place and time.        Diagnostic Data    ECG 12 Lead    Date/Time: 7/7/2025 4:56 PM  Performed by: Rony Aldridge MD    Authorized by: Rony Aldridge MD  Comparison: compared with previous ECG from 1/7/2025  Similar to previous ECG  Rhythm: sinus rhythm  Rate: normal  BPM: 64  QRS axis: normal  Other findings: left ventricular hypertrophy    Clinical impression: normal ECG        Assessment and Plan  Diagnoses and all orders for this visit:    Dilated cardiomyopathy- He seems doing better, will keep same meds Coreg, Entresto, farxiga and Aldactone, BP is 110.60, EKG sinus HR 64    Hypertension, essential- BP is 110.60 on failure meds    Nonrheumatic aortic valve insufficiency- Mild AS and mild AI, possible bicuspid valve will observe, might need echo in 3 years     Morbid (severe) obesity due to excess calories    Other orders  -     Farxiga 10 MG tablet; Take 10 mg by mouth Daily.  -     rosuvastatin (CRESTOR) 20 MG tablet; Take 1 tablet by mouth Daily.         Return in about 6 months (around 1/7/2026) for Recheck with Dr. Aldridge.    Rony Aldridge MD  07/07/2025

## 2025-07-08 ENCOUNTER — TELEPHONE (OUTPATIENT)
Dept: CARDIOLOGY | Facility: CLINIC | Age: 50
End: 2025-07-08
Payer: COMMERCIAL

## 2025-07-08 NOTE — TELEPHONE ENCOUNTER
Farxiga 10mg tablets  Outcome  Approved today by [facility] 2017  Your PA request has been approved. Additional information will be provided in the approval communication. (Message [id number])  Effective Date: 7/8/2025  Authorization Expiration Date: 7/8/2026

## (undated) DEVICE — TR BAND RADIAL ARTERY COMPRESSION DEVICE: Brand: TR BAND

## (undated) DEVICE — CATH DIAG EXPO M/ PK 5F FL4/FR4 PIG

## (undated) DEVICE — MODEL BT2000 P/N 700287-012KIT CONTENTS: MANIFOLD WITH SALINE AND CONTRAST PORTS, SALINE TUBING WITH SPIKE AND HAND SYRINGE, TRANSDUCER: Brand: BT2000 AUTOMATED MANIFOLD KIT

## (undated) DEVICE — GW PERIPH GUIDERIGHT STD/EXCHNG/J/TIP SS 0.035IN 5X260CM

## (undated) DEVICE — PK CATH CARD 10

## (undated) DEVICE — ADULT, W/LG. BACK PAD, RADIOTRANSPARENT ELEMENT AND LEAD WIRE COMPATIBLE W/: Brand: DEFIBRILLATION ELECTRODES

## (undated) DEVICE — CVR PROB ULTRASND/TRANSD W/GEL 7X11IN STRL

## (undated) DEVICE — GLIDESHEATH SLENDER STAINLESS STEEL KIT: Brand: GLIDESHEATH SLENDER

## (undated) DEVICE — MODEL AT P65, P/N 701554-001KIT CONTENTS: HAND CONTROLLER, 3-WAY HIGH-PRESSURE STOPCOCK WITH ROTATING END AND PREMIUM HIGH-PRESSURE TUBING: Brand: ANGIOTOUCH® KIT